# Patient Record
Sex: FEMALE | Race: WHITE | Employment: OTHER | URBAN - METROPOLITAN AREA
[De-identification: names, ages, dates, MRNs, and addresses within clinical notes are randomized per-mention and may not be internally consistent; named-entity substitution may affect disease eponyms.]

---

## 2017-02-06 ENCOUNTER — TELEPHONE (OUTPATIENT)
Dept: FAMILY MEDICINE CLINIC | Age: 52
End: 2017-02-06

## 2018-08-22 ENCOUNTER — HOSPITAL ENCOUNTER (INPATIENT)
Age: 53
LOS: 7 days | Discharge: HOME | DRG: 052 | End: 2018-08-29
Attending: EMERGENCY MEDICINE | Admitting: INTERNAL MEDICINE
Payer: MEDICAID

## 2018-08-22 ENCOUNTER — APPOINTMENT (OUTPATIENT)
Dept: GENERAL RADIOLOGY | Age: 53
DRG: 052 | End: 2018-08-22
Payer: MEDICAID

## 2018-08-22 ENCOUNTER — APPOINTMENT (OUTPATIENT)
Dept: CT IMAGING | Age: 53
DRG: 052 | End: 2018-08-22
Payer: MEDICAID

## 2018-08-22 ENCOUNTER — APPOINTMENT (OUTPATIENT)
Dept: ULTRASOUND IMAGING | Age: 53
DRG: 052 | End: 2018-08-22
Payer: MEDICAID

## 2018-08-22 DIAGNOSIS — E11.65 TYPE 2 DIABETES MELLITUS WITH HYPERGLYCEMIA, WITHOUT LONG-TERM CURRENT USE OF INSULIN (HCC): ICD-10-CM

## 2018-08-22 DIAGNOSIS — I63.9 CEREBROVASCULAR ACCIDENT (CVA), UNSPECIFIED MECHANISM (HCC): ICD-10-CM

## 2018-08-22 DIAGNOSIS — I67.9 VERTIGO DUE TO CEREBROVASCULAR DISEASE: Primary | ICD-10-CM

## 2018-08-22 DIAGNOSIS — R42 VERTIGO DUE TO CEREBROVASCULAR DISEASE: Primary | ICD-10-CM

## 2018-08-22 PROBLEM — E66.9 OBESITY: Status: ACTIVE | Noted: 2018-08-22

## 2018-08-22 LAB
A/G RATIO: 1.5 (ref 1.1–2.2)
ALBUMIN SERPL-MCNC: 4.7 G/DL (ref 3.4–5)
ALP BLD-CCNC: 73 U/L (ref 40–129)
ALT SERPL-CCNC: 206 U/L (ref 10–40)
ANION GAP SERPL CALCULATED.3IONS-SCNC: 13 MMOL/L (ref 3–16)
AST SERPL-CCNC: 161 U/L (ref 15–37)
BASE EXCESS VENOUS: 1 MMOL/L (ref -3–3)
BASOPHILS ABSOLUTE: 0 K/UL (ref 0–0.2)
BASOPHILS RELATIVE PERCENT: 0.5 %
BILIRUB SERPL-MCNC: 0.7 MG/DL (ref 0–1)
BILIRUBIN URINE: NEGATIVE
BLOOD, URINE: NEGATIVE
BUN BLDV-MCNC: 9 MG/DL (ref 7–20)
CALCIUM SERPL-MCNC: 10 MG/DL (ref 8.3–10.6)
CARBOXYHEMOGLOBIN: 1.9 % (ref 0–1.5)
CHLORIDE BLD-SCNC: 93 MMOL/L (ref 99–110)
CLARITY: CLEAR
CO2: 27 MMOL/L (ref 21–32)
COLOR: YELLOW
CREAT SERPL-MCNC: <0.5 MG/DL (ref 0.6–1.1)
EOSINOPHILS ABSOLUTE: 0.1 K/UL (ref 0–0.6)
EOSINOPHILS RELATIVE PERCENT: 0.9 %
GFR AFRICAN AMERICAN: >60
GFR NON-AFRICAN AMERICAN: >60
GLOBULIN: 3.1 G/DL
GLUCOSE BLD-MCNC: 178 MG/DL (ref 70–99)
GLUCOSE BLD-MCNC: 368 MG/DL (ref 70–99)
GLUCOSE URINE: >=1000 MG/DL
HCO3 VENOUS: 28.8 MMOL/L (ref 23–29)
HCT VFR BLD CALC: 45.2 % (ref 36–48)
HEMOGLOBIN: 15.7 G/DL (ref 12–16)
KETONES, URINE: NEGATIVE MG/DL
LACTIC ACID, SEPSIS: 1.1 MMOL/L (ref 0.4–1.9)
LACTIC ACID: 1 MMOL/L (ref 0.4–2)
LACTIC ACID: 2.5 MMOL/L (ref 0.4–2)
LEUKOCYTE ESTERASE, URINE: NEGATIVE
LYMPHOCYTES ABSOLUTE: 1.8 K/UL (ref 1–5.1)
LYMPHOCYTES RELATIVE PERCENT: 31.1 %
MCH RBC QN AUTO: 29.6 PG (ref 26–34)
MCHC RBC AUTO-ENTMCNC: 34.6 G/DL (ref 31–36)
MCV RBC AUTO: 85.5 FL (ref 80–100)
METHEMOGLOBIN VENOUS: 0.5 %
MICROSCOPIC EXAMINATION: ABNORMAL
MONOCYTES ABSOLUTE: 0.4 K/UL (ref 0–1.3)
MONOCYTES RELATIVE PERCENT: 7.5 %
NEUTROPHILS ABSOLUTE: 3.5 K/UL (ref 1.7–7.7)
NEUTROPHILS RELATIVE PERCENT: 60 %
NITRITE, URINE: NEGATIVE
O2 CONTENT, VEN: 15 VOL %
O2 SAT, VEN: 63 %
O2 THERAPY: ABNORMAL
PCO2, VEN: 57.3 MMHG (ref 40–50)
PDW BLD-RTO: 13.1 % (ref 12.4–15.4)
PERFORMED ON: ABNORMAL
PH UA: 6
PH VENOUS: 7.32 (ref 7.35–7.45)
PLATELET # BLD: 188 K/UL (ref 135–450)
PMV BLD AUTO: 8.9 FL (ref 5–10.5)
PO2, VEN: 31.6 MMHG (ref 25–40)
POTASSIUM SERPL-SCNC: 4.1 MMOL/L (ref 3.5–5.1)
PROTEIN UA: NEGATIVE MG/DL
RBC # BLD: 5.28 M/UL (ref 4–5.2)
SODIUM BLD-SCNC: 133 MMOL/L (ref 136–145)
SPECIFIC GRAVITY UA: 1.01
TCO2 CALC VENOUS: 31 MMOL/L
TOTAL PROTEIN: 7.8 G/DL (ref 6.4–8.2)
TROPONIN: <0.01 NG/ML
URINE TYPE: ABNORMAL
UROBILINOGEN, URINE: 0.2 E.U./DL
WBC # BLD: 5.7 K/UL (ref 4–11)

## 2018-08-22 PROCEDURE — 84484 ASSAY OF TROPONIN QUANT: CPT

## 2018-08-22 PROCEDURE — 85025 COMPLETE CBC W/AUTO DIFF WBC: CPT

## 2018-08-22 PROCEDURE — 76705 ECHO EXAM OF ABDOMEN: CPT

## 2018-08-22 PROCEDURE — 99285 EMERGENCY DEPT VISIT HI MDM: CPT

## 2018-08-22 PROCEDURE — 83036 HEMOGLOBIN GLYCOSYLATED A1C: CPT

## 2018-08-22 PROCEDURE — 70450 CT HEAD/BRAIN W/O DYE: CPT

## 2018-08-22 PROCEDURE — 71046 X-RAY EXAM CHEST 2 VIEWS: CPT

## 2018-08-22 PROCEDURE — 2580000003 HC RX 258: Performed by: EMERGENCY MEDICINE

## 2018-08-22 PROCEDURE — 1200000000 HC SEMI PRIVATE

## 2018-08-22 PROCEDURE — 96374 THER/PROPH/DIAG INJ IV PUSH: CPT

## 2018-08-22 PROCEDURE — 6370000000 HC RX 637 (ALT 250 FOR IP): Performed by: NURSE PRACTITIONER

## 2018-08-22 PROCEDURE — 2580000003 HC RX 258: Performed by: INTERNAL MEDICINE

## 2018-08-22 PROCEDURE — 81003 URINALYSIS AUTO W/O SCOPE: CPT

## 2018-08-22 PROCEDURE — G0378 HOSPITAL OBSERVATION PER HR: HCPCS

## 2018-08-22 PROCEDURE — 36415 COLL VENOUS BLD VENIPUNCTURE: CPT

## 2018-08-22 PROCEDURE — 6370000000 HC RX 637 (ALT 250 FOR IP): Performed by: EMERGENCY MEDICINE

## 2018-08-22 PROCEDURE — 6370000000 HC RX 637 (ALT 250 FOR IP): Performed by: INTERNAL MEDICINE

## 2018-08-22 PROCEDURE — 96361 HYDRATE IV INFUSION ADD-ON: CPT

## 2018-08-22 PROCEDURE — 87040 BLOOD CULTURE FOR BACTERIA: CPT

## 2018-08-22 PROCEDURE — 6360000002 HC RX W HCPCS: Performed by: EMERGENCY MEDICINE

## 2018-08-22 PROCEDURE — 83605 ASSAY OF LACTIC ACID: CPT

## 2018-08-22 PROCEDURE — 80074 ACUTE HEPATITIS PANEL: CPT

## 2018-08-22 PROCEDURE — 82803 BLOOD GASES ANY COMBINATION: CPT

## 2018-08-22 PROCEDURE — 96375 TX/PRO/DX INJ NEW DRUG ADDON: CPT

## 2018-08-22 PROCEDURE — 80053 COMPREHEN METABOLIC PANEL: CPT

## 2018-08-22 RX ORDER — LOSARTAN POTASSIUM AND HYDROCHLOROTHIAZIDE 25; 100 MG/1; MG/1
1 TABLET ORAL DAILY
Status: DISCONTINUED | OUTPATIENT
Start: 2018-08-22 | End: 2018-08-22 | Stop reason: CLARIF

## 2018-08-22 RX ORDER — PRAVASTATIN SODIUM 20 MG
20 TABLET ORAL NIGHTLY
Status: DISCONTINUED | OUTPATIENT
Start: 2018-08-22 | End: 2018-08-22

## 2018-08-22 RX ORDER — 0.9 % SODIUM CHLORIDE 0.9 %
1000 INTRAVENOUS SOLUTION INTRAVENOUS ONCE
Status: DISCONTINUED | OUTPATIENT
Start: 2018-08-22 | End: 2018-08-22

## 2018-08-22 RX ORDER — CITALOPRAM 20 MG/1
20 TABLET ORAL DAILY
Status: DISCONTINUED | OUTPATIENT
Start: 2018-08-23 | End: 2018-08-29 | Stop reason: HOSPADM

## 2018-08-22 RX ORDER — ASPIRIN 81 MG/1
81 TABLET ORAL DAILY
Status: DISCONTINUED | OUTPATIENT
Start: 2018-08-22 | End: 2018-08-22 | Stop reason: SDUPTHER

## 2018-08-22 RX ORDER — HYDROCHLOROTHIAZIDE 25 MG/1
25 TABLET ORAL DAILY
Status: DISCONTINUED | OUTPATIENT
Start: 2018-08-22 | End: 2018-08-22

## 2018-08-22 RX ORDER — KETOROLAC TROMETHAMINE 30 MG/ML
30 INJECTION, SOLUTION INTRAMUSCULAR; INTRAVENOUS ONCE
Status: COMPLETED | OUTPATIENT
Start: 2018-08-22 | End: 2018-08-22

## 2018-08-22 RX ORDER — 0.9 % SODIUM CHLORIDE 0.9 %
30 INTRAVENOUS SOLUTION INTRAVENOUS ONCE
Status: COMPLETED | OUTPATIENT
Start: 2018-08-22 | End: 2018-08-22

## 2018-08-22 RX ORDER — ONDANSETRON 2 MG/ML
4 INJECTION INTRAMUSCULAR; INTRAVENOUS EVERY 6 HOURS PRN
Status: DISCONTINUED | OUTPATIENT
Start: 2018-08-22 | End: 2018-08-29 | Stop reason: HOSPADM

## 2018-08-22 RX ORDER — MECLIZINE HCL 12.5 MG/1
12.5 TABLET ORAL 3 TIMES DAILY PRN
Status: DISCONTINUED | OUTPATIENT
Start: 2018-08-22 | End: 2018-08-29 | Stop reason: HOSPADM

## 2018-08-22 RX ORDER — MECLIZINE HCL 12.5 MG/1
25 TABLET ORAL ONCE
Status: COMPLETED | OUTPATIENT
Start: 2018-08-22 | End: 2018-08-22

## 2018-08-22 RX ORDER — ASPIRIN 81 MG/1
81 TABLET, CHEWABLE ORAL DAILY
Status: DISCONTINUED | OUTPATIENT
Start: 2018-08-22 | End: 2018-08-29 | Stop reason: HOSPADM

## 2018-08-22 RX ORDER — FLUTICASONE PROPIONATE 50 MCG
1 SPRAY, SUSPENSION (ML) NASAL DAILY
Status: DISCONTINUED | OUTPATIENT
Start: 2018-08-23 | End: 2018-08-29 | Stop reason: HOSPADM

## 2018-08-22 RX ORDER — HYDROCHLOROTHIAZIDE 25 MG/1
25 TABLET ORAL DAILY
Status: DISCONTINUED | OUTPATIENT
Start: 2018-08-22 | End: 2018-08-29 | Stop reason: HOSPADM

## 2018-08-22 RX ORDER — SODIUM CHLORIDE 0.9 % (FLUSH) 0.9 %
10 SYRINGE (ML) INJECTION EVERY 12 HOURS SCHEDULED
Status: DISCONTINUED | OUTPATIENT
Start: 2018-08-22 | End: 2018-08-29 | Stop reason: HOSPADM

## 2018-08-22 RX ORDER — SODIUM CHLORIDE 0.9 % (FLUSH) 0.9 %
10 SYRINGE (ML) INJECTION PRN
Status: DISCONTINUED | OUTPATIENT
Start: 2018-08-22 | End: 2018-08-29 | Stop reason: HOSPADM

## 2018-08-22 RX ORDER — SODIUM CHLORIDE 0.9 % (FLUSH) 0.9 %
10 SYRINGE (ML) INJECTION PRN
Status: DISCONTINUED | OUTPATIENT
Start: 2018-08-22 | End: 2018-08-22

## 2018-08-22 RX ORDER — SODIUM CHLORIDE 0.9 % (FLUSH) 0.9 %
10 SYRINGE (ML) INJECTION EVERY 12 HOURS SCHEDULED
Status: DISCONTINUED | OUTPATIENT
Start: 2018-08-22 | End: 2018-08-22

## 2018-08-22 RX ORDER — LOSARTAN POTASSIUM 100 MG/1
100 TABLET ORAL DAILY
Status: DISCONTINUED | OUTPATIENT
Start: 2018-08-22 | End: 2018-08-29 | Stop reason: HOSPADM

## 2018-08-22 RX ORDER — METOCLOPRAMIDE HYDROCHLORIDE 5 MG/ML
10 INJECTION INTRAMUSCULAR; INTRAVENOUS ONCE
Status: COMPLETED | OUTPATIENT
Start: 2018-08-22 | End: 2018-08-22

## 2018-08-22 RX ORDER — SODIUM CHLORIDE 9 MG/ML
INJECTION, SOLUTION INTRAVENOUS CONTINUOUS
Status: DISCONTINUED | OUTPATIENT
Start: 2018-08-22 | End: 2018-08-22

## 2018-08-22 RX ORDER — LOSARTAN POTASSIUM 100 MG/1
100 TABLET ORAL DAILY
Status: DISCONTINUED | OUTPATIENT
Start: 2018-08-22 | End: 2018-08-22

## 2018-08-22 RX ADMIN — METOCLOPRAMIDE 10 MG: 5 INJECTION, SOLUTION INTRAMUSCULAR; INTRAVENOUS at 14:38

## 2018-08-22 RX ADMIN — SODIUM CHLORIDE: 9 INJECTION, SOLUTION INTRAVENOUS at 17:14

## 2018-08-22 RX ADMIN — MECLIZINE 25 MG: 12.5 TABLET ORAL at 17:13

## 2018-08-22 RX ADMIN — SODIUM CHLORIDE, PRESERVATIVE FREE 10 ML: 5 INJECTION INTRAVENOUS at 23:42

## 2018-08-22 RX ADMIN — LOSARTAN POTASSIUM 100 MG: 100 TABLET, FILM COATED ORAL at 23:39

## 2018-08-22 RX ADMIN — INSULIN HUMAN 10 UNITS: 100 INJECTION, SOLUTION PARENTERAL at 13:11

## 2018-08-22 RX ADMIN — HYDROCHLOROTHIAZIDE 25 MG: 25 TABLET ORAL at 23:39

## 2018-08-22 RX ADMIN — SODIUM CHLORIDE 2817 ML: 9 INJECTION, SOLUTION INTRAVENOUS at 14:38

## 2018-08-22 RX ADMIN — ASPIRIN 81 MG 81 MG: 81 TABLET ORAL at 23:39

## 2018-08-22 RX ADMIN — MECLIZINE 12.5 MG: 12.5 TABLET ORAL at 23:44

## 2018-08-22 RX ADMIN — INSULIN LISPRO 5 UNITS: 100 INJECTION, SOLUTION INTRAVENOUS; SUBCUTANEOUS at 23:39

## 2018-08-22 RX ADMIN — KETOROLAC TROMETHAMINE 30 MG: 30 INJECTION, SOLUTION INTRAMUSCULAR at 14:38

## 2018-08-22 ASSESSMENT — PAIN SCALES - GENERAL
PAINLEVEL_OUTOF10: 9
PAINLEVEL_OUTOF10: 4

## 2018-08-22 ASSESSMENT — PAIN DESCRIPTION - PAIN TYPE: TYPE: ACUTE PAIN

## 2018-08-22 ASSESSMENT — PAIN DESCRIPTION - LOCATION: LOCATION: HEAD

## 2018-08-22 NOTE — H&P
Hospital Medicine History & Physical      PCP: Chapin Jennings    Date of Admission: 2018    Date of Service: Pt seen/examined on 2018 and Admitted to Inpatient with expected LOS greater than two midnights due to medical therapy. Chief Complaint:  Vertigo for last 5 days      History Of Present Illness:  (  49 y.o. female who presented to [facility] with about complaint. She developed what I go 5 days ago. Persistently the area and she saw the doctor today and she was sent to emergency room for further evaluation. Whenever she started with what I go she experiences double vision. He has some visual changes time to time. No change in mental status, headache, fever, focal neurological symptoms over the arms or legs. her appetite is good. She she gets cough when she lies down specially in the night and she uses Flonase  For  nasal congestion. Denies tinnitus or ear pain. Denies chest pain, shortness of breath or syncope    Past Medical History:          Diagnosis Date    Diabetes mellitus ([address]     Hyperlipidemia     Hypertension        Past Surgical History:          Procedure Laterality Date     SECTION      HYSTERECTOMY         Medications Prior to Admission:      Prior to Admission medications    Medication Sig Start Date End Date Taking?  Authorizing Provider   citalopram (CELEXA) 20 MG tablet TAKE 1 TABLET BY MOUTH EVERY DAY 16  Yes Obed Aragon MD   [facility] ALLERGY RELIEF 50 MCG/ACT nasal spray  16  Yes Historical Provider, MD   losartan-hydrochlorothiazide (HYZAAR) 100-25 MG per tablet Take 1 tablet by mouth daily 16  Yes Celso Dimas MD   pravastatin (PRAVACHOL) 20 MG tablet Take 1 tablet by mouth nightly 16  Yes Celso Dimas MD   metFORMIN (GLUCOPHAGE) 1000 MG tablet TAKE 1 TABLET BY MOUTH TWICE DAILY WITH A MEAL 16  Yes Celso Dimas MD   aspirin 81 MG tablet Take 81 mg by mouth daily   Yes Historical Provider, MD Allergies:  Atorvastatin; Codeine; and Penicillins    Social History:      The patient currently lives with family    TOBACCO:   reports that she has never smoked. She has never used smokeless tobacco.  ETOH:   reports that she does not drink alcohol. Family History:      Reviewed in detail and negative for DM, CAD, Cancer, CVA. Positive as follows:    Family History   Problem Relation Age of Onset    High Blood Pressure Mother     High Cholesterol Mother     Stroke Mother     Diabetes Mother     Cancer Mother     Mental Illness Mother     Arthritis Mother     High Blood Pressure Father     High Cholesterol Father     Diabetes Father     Mental Illness Father     High Blood Pressure Sister     High Cholesterol Sister     Mental Illness Sister        REVIEW OF SYSTEMS:   Pertinent positives as noted in the HPI. All other systems reviewed and negative. PHYSICAL EXAM PERFORMED:    BP (!) 144/92   Pulse 76   Temp 99.4 °F (37.4 °C) (Oral)   Resp 12   Ht 5' 8\" (1.727 m)   Wt 207 lb (93.9 kg)   LMP  (LMP Unknown)   SpO2 99%   BMI 31.47 kg/m²     General appearance:  No apparent distress, appears stated age and cooperative. HEENT:  Normal cephalic, atraumatic without obvious deformity. Pupils equal, round, and reactive to light. Extra ocular muscles intact. Conjunctivae/corneas clear. patient is not fully cooperative for eye examination because of worsening dizziness  Neck: Supple, with full range of motion. No jugular venous distention. Trachea midline. Respiratory:  Normal respiratory effort. Clear to auscultation, bilaterally without Rales/Wheezes/Rhonchi. Cardiovascular:  Regular rate and rhythm with normal S1/S2 without murmurs, rubs or gallops. Abdomen: Soft, non-tender, non-distended with normal bowel sounds. Obese  Musculoskeletal:  No clubbing, cyanosis or edema bilaterally. Full range of motion without deformity.   Skin: Skin color, texture, turgor normal.  No rashes or lesions. Neurologic:  Neurovascularly intact without any focal sensory/motor deficits. Cranial nerves: II-XII intact, grossly non-focal.  Psychiatric:  Alert and oriented, thought content appropriate, normal insight  Capillary Refill: Brisk,< 3 seconds   Peripheral Pulses: +2 palpable, equal bilaterally       Labs:     Recent Labs      08/22/18   1220   WBC  5.7   HGB  15.7   HCT  45.2   PLT  188     Recent Labs      08/22/18   1220   NA  133*   K  4.1   CL  93*   CO2  27   BUN  9   CREATININE  <0.5*   CALCIUM  10.0     Recent Labs      08/22/18   1220   AST  161*   ALT  206*   BILITOT  0.7   ALKPHOS  73     No results for input(s): INR in the last 72 hours. No results for input(s): Nora Dark in the last 72 hours. Urinalysis:      Lab Results   Component Value Date    NITRU Negative 08/22/2018    WBCUA 20-50 03/12/2015    BACTERIA 2+ 03/12/2015    RBCUA 0-2 03/12/2015    BLOODU Negative 08/22/2018    SPECGRAV 1.010 08/22/2018    GLUCOSEU >=1000 08/22/2018       Radiology:     CXR: I have reviewed the CXR with the following interpretation:   EKG:  I have reviewed the EKG with the following interpretation: normal sinus rhythm per Dr. Indiana Kingsley ER physician ,could not locate the EKG at this time     CT HEAD WO CONTRAST   Final Result   No acute intracranial hemorrhage or mass effect. US GALLBLADDER RUQ   Final Result   Fatty liver         XR CHEST STANDARD (2 VW)   Final Result   No acute findings.              ASSESSMENT:    Active Hospital Problems    Diagnosis Date Noted    Vertigo [R42] 08/22/2018    Obesity [E66.9] 08/22/2018    DM (diabetes mellitus), type 2 ([facility] [address] [E11.9] 06/25/2015    HTN (hypertension) [I10] 06/25/2015    HLD (hyperlipidemia) [E78.5] 06/25/2015         PLAN:    Vertigo with visual changes - rule out cardio neurogenic causes - admit, telemetry, troponin, neuro check, aspirin, statin( on hold secondary to elevated liver function test), MRI of the brain MRA of the brain,

## 2018-08-23 ENCOUNTER — APPOINTMENT (OUTPATIENT)
Dept: MRI IMAGING | Age: 53
DRG: 052 | End: 2018-08-23
Payer: MEDICAID

## 2018-08-23 PROBLEM — I67.9 VERTIGO DUE TO CEREBROVASCULAR DISEASE: Status: ACTIVE | Noted: 2018-08-22

## 2018-08-23 LAB
ALBUMIN SERPL-MCNC: 3.9 G/DL (ref 3.4–5)
ALP BLD-CCNC: 60 U/L (ref 40–129)
ALT SERPL-CCNC: 179 U/L (ref 10–40)
AST SERPL-CCNC: 146 U/L (ref 15–37)
BILIRUB SERPL-MCNC: 0.4 MG/DL (ref 0–1)
BILIRUBIN DIRECT: <0.2 MG/DL (ref 0–0.3)
BILIRUBIN, INDIRECT: ABNORMAL MG/DL (ref 0–1)
CHOLESTEROL, TOTAL: 193 MG/DL (ref 0–199)
ESTIMATED AVERAGE GLUCOSE: 248.9 MG/DL
ESTIMATED AVERAGE GLUCOSE: 257.5 MG/DL
GLUCOSE BLD-MCNC: 243 MG/DL (ref 70–99)
GLUCOSE BLD-MCNC: 284 MG/DL (ref 70–99)
GLUCOSE BLD-MCNC: 287 MG/DL (ref 70–99)
GLUCOSE BLD-MCNC: 298 MG/DL (ref 70–99)
HAV IGM SER IA-ACNC: NORMAL
HAV IGM SER IA-ACNC: NORMAL
HBA1C MFR BLD: 10.3 %
HBA1C MFR BLD: 10.6 %
HDLC SERPL-MCNC: 36 MG/DL (ref 40–60)
HEPATITIS B CORE IGM ANTIBODY: NORMAL
HEPATITIS B CORE IGM ANTIBODY: NORMAL
HEPATITIS B SURFACE ANTIGEN INTERPRETATION: NORMAL
HEPATITIS B SURFACE ANTIGEN INTERPRETATION: NORMAL
HEPATITIS C ANTIBODY INTERPRETATION: NORMAL
HEPATITIS C ANTIBODY INTERPRETATION: NORMAL
LDL CHOLESTEROL CALCULATED: 108 MG/DL
LV EF: 55 %
LVEF MODALITY: NORMAL
PERFORMED ON: ABNORMAL
TOTAL PROTEIN: 6.5 G/DL (ref 6.4–8.2)
TRIGL SERPL-MCNC: 245 MG/DL (ref 0–150)
TROPONIN: <0.01 NG/ML
VLDLC SERPL CALC-MCNC: 49 MG/DL

## 2018-08-23 PROCEDURE — 6360000002 HC RX W HCPCS: Performed by: INTERNAL MEDICINE

## 2018-08-23 PROCEDURE — G8989 SELF CARE D/C STATUS: HCPCS

## 2018-08-23 PROCEDURE — 97162 PT EVAL MOD COMPLEX 30 MIN: CPT

## 2018-08-23 PROCEDURE — 97110 THERAPEUTIC EXERCISES: CPT

## 2018-08-23 PROCEDURE — G8978 MOBILITY CURRENT STATUS: HCPCS

## 2018-08-23 PROCEDURE — 97165 OT EVAL LOW COMPLEX 30 MIN: CPT

## 2018-08-23 PROCEDURE — 6370000000 HC RX 637 (ALT 250 FOR IP): Performed by: NURSE PRACTITIONER

## 2018-08-23 PROCEDURE — 93010 ELECTROCARDIOGRAM REPORT: CPT | Performed by: INTERNAL MEDICINE

## 2018-08-23 PROCEDURE — 1200000000 HC SEMI PRIVATE

## 2018-08-23 PROCEDURE — 97116 GAIT TRAINING THERAPY: CPT

## 2018-08-23 PROCEDURE — G8988 SELF CARE GOAL STATUS: HCPCS

## 2018-08-23 PROCEDURE — 96372 THER/PROPH/DIAG INJ SC/IM: CPT

## 2018-08-23 PROCEDURE — 2580000003 HC RX 258: Performed by: NURSE PRACTITIONER

## 2018-08-23 PROCEDURE — 80076 HEPATIC FUNCTION PANEL: CPT

## 2018-08-23 PROCEDURE — 36415 COLL VENOUS BLD VENIPUNCTURE: CPT

## 2018-08-23 PROCEDURE — G0378 HOSPITAL OBSERVATION PER HR: HCPCS

## 2018-08-23 PROCEDURE — 93005 ELECTROCARDIOGRAM TRACING: CPT | Performed by: INTERNAL MEDICINE

## 2018-08-23 PROCEDURE — 70544 MR ANGIOGRAPHY HEAD W/O DYE: CPT

## 2018-08-23 PROCEDURE — 97535 SELF CARE MNGMENT TRAINING: CPT

## 2018-08-23 PROCEDURE — G8979 MOBILITY GOAL STATUS: HCPCS

## 2018-08-23 PROCEDURE — 70551 MRI BRAIN STEM W/O DYE: CPT

## 2018-08-23 PROCEDURE — 6370000000 HC RX 637 (ALT 250 FOR IP): Performed by: INTERNAL MEDICINE

## 2018-08-23 PROCEDURE — 99223 1ST HOSP IP/OBS HIGH 75: CPT | Performed by: PSYCHIATRY & NEUROLOGY

## 2018-08-23 PROCEDURE — C8929 TTE W OR WO FOL WCON,DOPPLER: HCPCS

## 2018-08-23 PROCEDURE — 80061 LIPID PANEL: CPT

## 2018-08-23 PROCEDURE — 2580000003 HC RX 258: Performed by: INTERNAL MEDICINE

## 2018-08-23 PROCEDURE — G8987 SELF CARE CURRENT STATUS: HCPCS

## 2018-08-23 PROCEDURE — 84484 ASSAY OF TROPONIN QUANT: CPT

## 2018-08-23 PROCEDURE — 80074 ACUTE HEPATITIS PANEL: CPT

## 2018-08-23 RX ORDER — ACETAMINOPHEN 325 MG/1
650 TABLET ORAL EVERY 4 HOURS PRN
Status: DISCONTINUED | OUTPATIENT
Start: 2018-08-23 | End: 2018-08-29 | Stop reason: HOSPADM

## 2018-08-23 RX ORDER — DEXTROSE MONOHYDRATE 25 G/50ML
12.5 INJECTION, SOLUTION INTRAVENOUS PRN
Status: DISCONTINUED | OUTPATIENT
Start: 2018-08-23 | End: 2018-08-29 | Stop reason: HOSPADM

## 2018-08-23 RX ORDER — DEXTROSE MONOHYDRATE 50 MG/ML
100 INJECTION, SOLUTION INTRAVENOUS PRN
Status: DISCONTINUED | OUTPATIENT
Start: 2018-08-23 | End: 2018-08-29 | Stop reason: HOSPADM

## 2018-08-23 RX ORDER — SODIUM CHLORIDE 9 MG/ML
INJECTION, SOLUTION INTRAVENOUS CONTINUOUS
Status: DISCONTINUED | OUTPATIENT
Start: 2018-08-23 | End: 2018-08-25

## 2018-08-23 RX ORDER — NICOTINE POLACRILEX 4 MG
15 LOZENGE BUCCAL PRN
Status: DISCONTINUED | OUTPATIENT
Start: 2018-08-23 | End: 2018-08-29 | Stop reason: HOSPADM

## 2018-08-23 RX ADMIN — ACETAMINOPHEN 650 MG: 325 TABLET, FILM COATED ORAL at 17:11

## 2018-08-23 RX ADMIN — LOSARTAN POTASSIUM 100 MG: 100 TABLET, FILM COATED ORAL at 08:30

## 2018-08-23 RX ADMIN — ACETAMINOPHEN 650 MG: 325 TABLET, FILM COATED ORAL at 23:45

## 2018-08-23 RX ADMIN — MECLIZINE 12.5 MG: 12.5 TABLET ORAL at 07:41

## 2018-08-23 RX ADMIN — CITALOPRAM HYDROBROMIDE 20 MG: 20 TABLET ORAL at 08:30

## 2018-08-23 RX ADMIN — INSULIN LISPRO 9 UNITS: 100 INJECTION, SOLUTION INTRAVENOUS; SUBCUTANEOUS at 09:22

## 2018-08-23 RX ADMIN — SODIUM CHLORIDE, PRESERVATIVE FREE 10 ML: 5 INJECTION INTRAVENOUS at 08:30

## 2018-08-23 RX ADMIN — MECLIZINE 12.5 MG: 12.5 TABLET ORAL at 23:45

## 2018-08-23 RX ADMIN — INSULIN LISPRO 5 UNITS: 100 INJECTION, SOLUTION INTRAVENOUS; SUBCUTANEOUS at 23:38

## 2018-08-23 RX ADMIN — INSULIN LISPRO 9 UNITS: 100 INJECTION, SOLUTION INTRAVENOUS; SUBCUTANEOUS at 14:04

## 2018-08-23 RX ADMIN — ASPIRIN 81 MG 81 MG: 81 TABLET ORAL at 08:30

## 2018-08-23 RX ADMIN — SODIUM CHLORIDE: 9 INJECTION, SOLUTION INTRAVENOUS at 15:38

## 2018-08-23 RX ADMIN — ENOXAPARIN SODIUM 40 MG: 40 INJECTION, SOLUTION INTRAVENOUS; SUBCUTANEOUS at 08:30

## 2018-08-23 RX ADMIN — INSULIN LISPRO 6 UNITS: 100 INJECTION, SOLUTION INTRAVENOUS; SUBCUTANEOUS at 18:22

## 2018-08-23 RX ADMIN — HYDROCHLOROTHIAZIDE 25 MG: 25 TABLET ORAL at 08:30

## 2018-08-23 ASSESSMENT — PAIN DESCRIPTION - PROGRESSION: CLINICAL_PROGRESSION: NOT CHANGED

## 2018-08-23 ASSESSMENT — PAIN SCALES - GENERAL
PAINLEVEL_OUTOF10: 3
PAINLEVEL_OUTOF10: 7
PAINLEVEL_OUTOF10: 9
PAINLEVEL_OUTOF10: 4

## 2018-08-23 ASSESSMENT — PAIN DESCRIPTION - LOCATION
LOCATION: EYE;HEAD;NECK
LOCATION: EYE;HEAD;NECK

## 2018-08-23 ASSESSMENT — PAIN DESCRIPTION - DESCRIPTORS
DESCRIPTORS: DISCOMFORT
DESCRIPTORS: ACHING

## 2018-08-23 ASSESSMENT — PAIN DESCRIPTION - PAIN TYPE
TYPE: ACUTE PAIN
TYPE: ACUTE PAIN

## 2018-08-23 ASSESSMENT — PAIN DESCRIPTION - ONSET: ONSET: ON-GOING

## 2018-08-23 ASSESSMENT — PAIN DESCRIPTION - FREQUENCY: FREQUENCY: CONTINUOUS

## 2018-08-23 ASSESSMENT — PAIN DESCRIPTION - ORIENTATION: ORIENTATION: LEFT

## 2018-08-23 NOTE — CARE COORDINATION
Chart reviewed. Pt from home with family support, no services noted prior to admission. PT recommending Home with assist PRN , Outpatient PT. Pt will need doctor prescription for outp therapy, and can schedule that by herself. No discharge needs noted at this time, please notify DCP if needs arise.   Checo Bhat RN DCP

## 2018-08-23 NOTE — FLOWSHEET NOTE
Perfect Serve:    Pt is c/o a headache and dizziness. She received antivert and toradol in the ED and pt states it helped.   Thanks

## 2018-08-23 NOTE — PROGRESS NOTES
Occupational Therapy   Occupational Therapy Initial Assessment and Discharge Summary  Date: 2018   Patient Name: Yolanda Veliz  MRN: [id number]     : 1965    Date of Service: 2018    Discharge Recommendations:  Home with assist PRN (Pt may benefit from initial  supervision/assist)  OT Equipment Recommendations  Equipment Needed: No      Patient Diagnosis(es): The primary encounter diagnosis was Vertigo due to cerebrovascular disease. A diagnosis of Cerebrovascular accident (CVA), unspecified mechanism ([facility] [address] was also pertinent to this visit. has a past medical history of Diabetes mellitus ([facility] [address] Hyperlipidemia; and Hypertension. has a past surgical history that includes Hysterectomy and  section.     Restrictions  Restrictions/Precautions  Restrictions/Precautions: General Precautions, Fall Risk  Position Activity Restriction  Other position/activity restrictions: up with assist     Subjective   General  Chart Reviewed: Yes  Patient assessed for rehabilitation services?: Yes  Family / Caregiver Present: No  Referring Practitioner: Jesenia Gomez MD  Diagnosis: Vertigo with visual changes   Subjective  Subjective: Pt in bed on arrival, agreeable to eval and treat  General Comment  Comments: Per RN okay to treat  Pain Assessment  Patient Currently in Pain: Yes  Pain Assessment: 0-10  Pain Level: 9  Pain Type: Acute pain  Pain Location: Eye, Head, Neck  Pain Orientation: Left  Pain Descriptors: Aching  Pain Frequency: Continuous  Clinical Progression: Not changed  Pain Intervention(s): Repositioned, Ambulation/Increased activity, Distraction  Response to Pain Intervention: None    Social/Functional History  Social/Functional History  Lives With: Significant other  Type of Home: Apartment  Home Layout: One level, Laundry in basement  Home Access: Stairs to enter with rails (6)  Bathroom Shower/Tub: Tub/Shower unit  Bathroom Toilet: Standard  Bathroom Equipment: Grab bars in shower  ADL Assistance: Independent  Homemaking Assistance: Independent  Homemaking Responsibilities: Yes (Pt prepares meals, performs cleaning; does laundry at times)  Ambulation Assistance: Independent  Transfer Assistance: Independent  Active : No  Type of occupation: has been off work since December due to RLE and Low back pain. Worked in [employer]  Additional Comments: Pt reports SO works during the day       Objective   Vision: Within Functional Limits  Hearing: Within functional limits    Orientation  Overall Orientation Status: Within Functional Limits     Balance  Sitting Balance: Independent  Standing Balance: Supervision  Standing Balance  Time: 1-2 minutes, >5 minutes, <1 minute  Activity: mobility, standing ADL, transfers  Sit to stand: Supervision  Stand to sit: Supervision  Functional Mobility  Functional - Mobility Device: No device  Activity: Other; To/from bathroom  Assist Level: Stand by assistance  ADL  Grooming: Supervision (in stance at sink)  LE Dressing: Supervision  Toileting: Modified independent   Tone RUE  RUE Tone: Normotonic  Tone LUE  LUE Tone: Normotonic  Coordination  Movements Are Fluid And Coordinated: Yes     Bed mobility  Supine to Sit: Modified independent  Sit to Supine: Unable to assess (pt left up in chair)  Scooting: Modified independent (to EOB)  Transfers  Sit to stand: Supervision  Stand to sit: Supervision  Vision - Basic Assessment  Patient Visual Report: Blurring of vision when changing focal distance  Oculo Motor Control: WNL  Vision Comments: No blurring of vision with reading at close distance. Convergence, saccades, and visual tracking WFL.  Pt with initial c/o dizziness when looking to L, but no further complaint with saccades testing   Cognition  Overall Cognitive Status: WFL        Sensation  Overall Sensation Status: WFL        LUE AROM (degrees)  LUE AROM : WFL  Left Hand AROM (degrees)  Left Hand AROM: WFL  RUE AROM (degrees)  RUE AROM : WFL  Right Hand AROM (degrees)  Right Hand AROM: WFL  LUE Strength  Gross LUE Strength: WFL  RUE Strength  Gross RUE Strength: WFL          Assessment   Performance deficits / Impairments: Decreased functional mobility ; Decreased balance;Decreased ADL status; Decreased high-level IADLs  Assessment: Pt reports typically completes ADL/IADL, and walks without a device, presenting at [address] [place] [address] for functional mobility and transfers, and ADL this session. Pt reaching out for objects with ambulation, demonstrating slow amalia and decreased step length. Believe pt will be able to return home with PRN supervision/assist, reporting no OT concerns for home. PT will continue to work with pt to address balance, functional mobility and transfers. Prognosis: Good  Decision Making: Low Complexity  Patient Education: role of OT, safety   REQUIRES OT FOLLOW UP: No  Activity Tolerance  Activity Tolerance: Patient Tolerated treatment well  Safety Devices  Safety Devices in place: Yes  Type of devices: Left in chair;Chair alarm in place;Call light within reach;Gait belt;Nurse notified           G-Code  OT G-codes  Functional Assessment Tool Used: -PAC   Score: 21  Functional Limitation: Self care  Self Care Current Status (): At least 20 percent but less than 40 percent impaired, limited or restricted  Self Care Goal Status (): At least 20 percent but less than 40 percent impaired, limited or restricted  Self Care Discharge Status ():  At least 20 percent but less than 40 percent impaired, limited or restricted    AM-PAC Score  AM-[facility] Inpatient Daily Activity Raw Score: 21  AM-PAC Inpatient ADL T-Scale Score : 44.27  ADL Inpatient CMS 0-100% Score: 32.79  ADL Inpatient CMS G-Code Modifier : CJ    Goals  Short term goals  Time Frame for Short term goals: 1 time visit  Short term goal 1: Pt will complete functional transfers with supervision or better - GOAL MET  Short term goal 2: Pt will complete toileting with supervision or better - GOAL MET  Patient Goals   Patient goals : \"to get rid of the dizziness\"       Therapy Time   Individual Concurrent Group Co-treatment   Time In 2216 (10 minutes for eval)         Time Out 1104         Minutes 27         Timed Code Treatment Minutes: 17 Minutes     This note to serve as d/c summary should pt d/c prior to next session.     Han Burgess, OTR/L

## 2018-08-23 NOTE — CONSULTS
In patient Neurology consult        [facility]      MD Mike Maher  1965    Date of Service: 2018    Referring Physician: Katerina Bhatt MD      Reason for the consult: Acute dizziness and vertigo    HPI:   The patient is a 46y.o.  years old female with history of diabetes, hypertension and hyperlipidemia who was admitted to [facility] last night with acute vertigo. Symptoms started 5 days ago. Description was spinning sensation with occasional lightheadedness, blurred vision and feeling unsteady. Degree was severe. Duration was waxing and waning but persistent and daily. Triggers including moving her head or body quickly to the left more than the right side. No other associated symptoms. No falling or injury or blackout. No recent fever or chills or change in medications. She wasn't able to function at home. She decided to come to the hospital where she was eventually admitted. Today she is about the same. She denies any recent fever or chills or head trauma or change in medications. No prior history of dizziness. She denies ringing in her ears or hearing loss or chest pain. No other new symptoms today. Other review of system was unremarkable.         Past Medical History:   Diagnosis Date    Diabetes mellitus ([address]     Hyperlipidemia     Hypertension      Family History   Problem Relation Age of Onset    High Blood Pressure Mother     High Cholesterol Mother     Stroke Mother     Diabetes Mother     Cancer Mother     Mental Illness Mother     Arthritis Mother     High Blood Pressure Father     High Cholesterol Father     Diabetes Father     Mental Illness Father     High Blood Pressure Sister     High Cholesterol Sister     Mental Illness Sister      Past Surgical History:   Procedure Laterality Date     SECTION      HYSTERECTOMY       Past Surgical History:   Procedure Laterality Date     SECTION      HYSTERECTOMY Family History   Problem Relation Age of Onset    High Blood Pressure Mother     High Cholesterol Mother     Stroke Mother     Diabetes Mother     Cancer Mother     Mental Illness Mother     Arthritis Mother     High Blood Pressure Father     High Cholesterol Father     Diabetes Father     Mental Illness Father     High Blood Pressure Sister     High Cholesterol Sister     Mental Illness Sister      Social History   Substance Use Topics    Smoking status: Never Smoker    Smokeless tobacco: Never Used    Alcohol use No     Allergies   Allergen Reactions    Atorvastatin Nausea Only    Codeine     Penicillins      Current Facility-Administered Medications   Medication Dose Route Frequency Provider Last Rate Last Dose    glucose (GLUTOSE) 40 % oral gel 15 g  15 g Oral PRN Shannan Beverly, APRN - CNP        dextrose 50 % solution 12.5 g  12.5 g Intravenous PRN Shannan Beverly, APRN - CNP        glucagon (rDNA) injection 1 mg  1 mg Intramuscular PRN Shannan Beverly, APRN - CNP        dextrose 5 % solution  100 mL/hr Intravenous PRN Shannan Beverly, APRN - CNP        aspirin chewable tablet 81 mg  81 mg Oral Daily Jesenia Gomez MD   81 mg at 08/23/18 0830    citalopram (CELEXA) tablet 20 mg  20 mg Oral Daily Jesenia Gomez MD   20 mg at 08/23/18 0830    fluticasone (FLONASE) 50 MCG/ACT nasal spray 1 spray  1 spray Each Nare Daily Jesenia Gomez MD        sodium chloride flush 0.9 % injection 10 mL  10 mL Intravenous 2 times per day Jesenia Gomez MD   10 mL at 08/23/18 0830    sodium chloride flush 0.9 % injection 10 mL  10 mL Intravenous PRN Jesenia Gomez MD        magnesium hydroxide (MILK OF MAGNESIA) 400 MG/5ML suspension 30 mL  30 mL Oral Daily PRN Jesenia Gomez MD        ondansetron (ZOFRAN) injection 4 mg  4 mg Intravenous Q6H PRN Jesenia Gomez MD        enoxaparin (LOVENOX) injection 40 mg  40 mg Subcutaneous Daily Jesenia Gomez MD   40 mg at is symmetric  XI: Shoulder shrug is intact  XII: Tongue movements are normal  Musculoskeletal: 5/5 in all 4 extremities. Normal tone. Reflexes: Bilateral biceps 2/4, triceps 2/4, brachial radialis 2/4, knee 2/4 and ankle 2/4. Planters: flexor bilaterally. Coordination: no pronator drift, no dysmetria. Finger nose finger testing within normal limits. Sensation: normal to all modalities. Gait/Posture: Not tested due to dizziness     Data:  LABS:   Lab Results   Component Value Date     08/22/2018    K 4.1 08/22/2018    CL 93 08/22/2018    CO2 27 08/22/2018    BUN 9 08/22/2018    CREATININE <0.5 08/22/2018    GFRAA >60 08/22/2018    LABGLOM >60 08/22/2018    GLUCOSE 368 08/22/2018    CALCIUM 10.0 08/22/2018     Lab Results   Component Value Date    WBC 5.7 08/22/2018    RBC 5.28 08/22/2018    HGB 15.7 08/22/2018    HCT 45.2 08/22/2018    MCV 85.5 08/22/2018    RDW 13.1 08/22/2018     08/22/2018   No results found for: INR, PROTIME    Neuroimaging and/or  labs reviewed by me and discussed results with the patient    Impression:  Acute dizziness and vertigo. Nonfocal exam otherwise. Less likely central.  Possible benign paroxysmal vertigo or peripheral vertigo. Other possibility could include hypertensive encephalopathy. Blood pressure on admission was 204/91. Less likely cardiac arrhythmia  Hypertension, poorly controlled  Diabetes, poorly controlled. A1c 10  Hyperlipidemia  Depression       Recommendation:  Agree with MRI of the brain  OT and PT consultation  Blood pressure monitor and control. Goal for now is in the 160/90  Insulin sliding scale  Blood sugar monitoring  Continue aspirin  Statin  DVT and GI prophylaxis  Meclizine and Zofran as needed for symptomatic treatment  Hydration  Telemetry  Will consider CTA head and neck if symptoms persisted to rule out vertebrobasilar insufficiency giving multiple risk factor for strokes.   Continue SSRI  Will follow         Thank you for referring such patient. If you have any questions regarding my consult note, please don't hesitate to call me. Renetta Flores MD  [phone]    This dictation was generated by voice recognition computer software.  Although all attempts are made to edit the dictation for accuracy, there may be errors in the  transcription that are not intended

## 2018-08-23 NOTE — PROGRESS NOTES
Physical Therapy    Facility/Department: [facility] A2 CARD TELEMETRY  Initial Assessment    NAME: Echo Francisco  : 1965  MRN: [id number]    Date of Service: 2018    Discharge Recommendations:  Home with assist PRN, Outpatient PT        Patient Diagnosis(es): The primary encounter diagnosis was Vertigo due to cerebrovascular disease. A diagnosis of Cerebrovascular accident (CVA), unspecified mechanism ([address] was also pertinent to this visit. has a past medical history of Diabetes mellitus ([address] Hyperlipidemia; and Hypertension. has a past surgical history that includes Hysterectomy and  section.     Restrictions  Restrictions/Precautions: General Precautions, Fall Risk  Position Activity Restriction  Other position/activity restrictions: up with assist   Vision/Hearing  Vision: Impaired (reports blurry vision)  Hearing: Within functional limits     Subjective  Chart Reviewed: Yes  Patient assessed for rehabilitation services?: Yes  Referring Practitioner: Magda Whitley  Referral Date : 18  Diagnosis: dizziness, blurry vision  Follows Commands: Within Functional Limits  Comments: RN cleared pt for therapy, pt resting in bed on approach  Subjective: Agrees to therapy, reports L eye pain radiating around orbit and to L neck  Pain Screening  Patient Currently in Pain: Yes  Pain Assessment  Pain Assessment: 0-10  Pain Level: 7  Pain Type: Acute pain  Pain Location: Eye;Head;Neck  Pain Orientation: Left  Pain Descriptors: Discomfort  Pain Frequency: Continuous  Pain Onset: On-going  Clinical Progression: Not changed  Pain Intervention(s): Emotional support  Response to Pain Intervention: None  Pre Treatment Pain Screening  Intervention List: Patient able to continue with treatment    Orientation  Orientation  Overall Orientation Status: Within Functional Limits    Social/Functional History  Social/Functional History  Lives With: Significant other  Type of Home: Apartment  Home Layout: One level  Home Access: Stairs to enter with rails (6)  Bathroom Shower/Tub: Tub/Shower unit  Bathroom Toilet: Standard  Bathroom Equipment: Grab bars in shower  ADL Assistance: Independent  Ambulation Assistance: Independent  Transfer Assistance: Independent  Active : No  Type of occupation: has been off work since December due to RLE and Low back pain. Worked in [email or web address]  Objective   RLE AROM: WFL  LLE AROM : WFL  Strength RLE: RLE hip flexion 3+, abd 3+, knee ext 4, DF 5, EHL 5, ankle eversion 5  Strength LLE LLE hip flexion 3+, abd 3+, knee ext 4-, DF 4, EHL 3, ankle eversion 4+ ( pt moves LLE slowly compared to RLE)     Sensation  Overall Sensation Status: WFL  Bed mobility  Supine to Sit: Modified independent  Sit to Supine: Modified independent  Comment: HOB slightly elevated, use of bed rail to pull up, extra time needed  Transfers  Sit to Stand: Supervision  Stand to sit: Supervision  Comment: uses arms to arise and lower both from bed and toilet. Practiced SIt to and from stand 5 x from EOB  Ambulation  Surface: level tile  Device: No Device (Occassionally used sousa rail or reached for wall for support)  Assistance: Contact guard assistance  Quality of Gait: Two episodes of minor Bilat knee buckling during 1st 5 ft of amb, pt reported this was due to dizziness. After that no episodes of buckling or loss of balance. Distance: 80 ft     Balance  Sitting - Static: Good  Standing - Static: Good  Standing - Dynamic: Good  Exercises  Bridging:  (3 x with good hip clearance from bed)  Straight Leg Raise: 5 x B  Heelslides: 5 x B  Gluteal Sets: 5 x B  Hip Extension/Leg Presses: Sit to and from stand 5 x  Hip Abduction: 5 x B supine, 5 x B hooklying isometric clam shell  Knee Passive Range of Motion: Hip ER/IR: 5 x B supine  Ankle Pumps: 10 x B     Assessment   Body structures, Functions, Activity limitations: Decreased functional mobility ; Decreased strength;Decreased endurance;Decreased vision/visual deficit  Assessment: Pt adm with report of visual changes and dizziness. Found to have high blood sugars and reported improvement with antivert. Today MMT inconsistent showing LLE weaker although pt reports difficulty with RLE. Pt did perform modified indep bed mob, supervised transfers and amb 80 ft without device but with CG & occassionally using sousa rail for support. Recommend home assist prn, Out patient PT if symptoms persist.  Treatment Diagnosis: decreased mobility  Specific instructions for Next Treatment: therapeutic ex, functional mob,gait, stairs  Prognosis: Good  Decision Making: Medium Complexity  Patient Education: role of PT< safety, importance of mobility, ther ex  Barriers to Learning: pt voiced understanding  REQUIRES PT FOLLOW UP: Yes  Activity Tolerance  Activity Tolerance: Patient Tolerated treatment well         Plan   Plan  Times per week: 3-5 x week  Specific instructions for Next Treatment: therapeutic ex, functional mob,gait, stairs  Current Treatment Recommendations: Strengthening, Transfer Training, Endurance Training, Gait Training, Functional Mobility Training, Safety Education & Training, Home Exercise Program  Safety Devices  Type of devices: All fall risk precautions in place, Gait belt, Patient at risk for falls, Call light within reach, Left in bed, Nurse notified    G-Code  PT G-Codes  Functional Assessment Tool Used: AM PAC  Score: 18  Functional Limitation: Mobility: Walking and moving around  Mobility: Walking and Moving Around Current Status (): At least 20 percent but less than 40 percent impaired, limited or restricted  Mobility: Walking and Moving Around Goal Status ():  At least 1 percent but less than 20 percent impaired, limited or restricted  AM-PAC Score     AM-PAC Inpatient Mobility without Stair Climbing Raw Score : 18  AM-PAC Inpatient without Stair Climbing T-Scale Score : 51.97  Mobility Inpatient CMS 0-100% Score: 23.26  Mobility Inpatient without Stair

## 2018-08-23 NOTE — ED PROVIDER NOTES
symmetric rise. EYES:  Conjunctiva normal, Pupils equal round and reactive to light, extraocular movements normal.No nystagmus  NECK:  Trachea is midline. No stridor. CHEST:  Regular rate and rhythm, no murmurs/rubs/gallops, normal S1/S2, chest wall non-tender. LUNGS:  No respiratory distress. No abdominal retractions, no sternal retractions. Clear to auscultation bilaterally, no wheezing, no rhochi, no rales  ABDOMEN:  Soft, non-tender, non-distended. No guarding and no rebound. No costovertebral angle tenderness to palpation. Normal BS, no organomegaly, no abdominal masses  EXTREMITIES:  Normal range of motion, no edema, no tenderness, no deformity, distal pulses present. Moves extremities x4 with purpose. SKIN: Warm, dry and intact. NEUROLOGIC: Normal mental status. Moving all extremities to command. Alert and oriented x4 without focal deficit or gross sensory deficit. Normal speech. Strength 5/5 in bilateral upper and lower extremities. Sensation is intact in the upper and lower extremities. Cranial Nerves 2-12 are intact. On her finger to nose she had some ataxia with the left hand and no ataxia in the right hand. She was unable to completely heal to shin due to severe vertigo. The patient kept her eyes closed a significant amount of time during the neurological exam.  I would not able to ambulate her as the vertigo was severe. No truncal ataxia. Test skew:  Negative. Nystamus:  Negative. PSYCHIATRIC: Not anxious, normal mood and affect, thoughts are linear and organized, without delusions/hallucinations, responds appropriately to questions      LABS and DIAGNOSTIC RESULTS    RADIOLOGY  X-RAYS:  I have reviewed radiologic plain film image(s). ALL OTHER NON-PLAIN FILM IMAGES SUCH AS CT, ULTRASOUND AND MRI HAVE BEEN READ BY THE RADIOLOGIST. CT HEAD WO CONTRAST   Final Result   No acute intracranial hemorrhage or mass effect.          US GALLBLADDER RUQ   Final Result   Fatty liver XR CHEST STANDARD (2 VW)   Final Result   No acute findings.          MRI brain without contrast    (Results Pending)   MRA head w/o contrast    (Results Pending)        LABS  Results for orders placed or performed during the hospital encounter of 08/22/18   CBC Auto Differential   Result Value Ref Range    WBC 5.7 4.0 - 11.0 K/uL    RBC 5.28 (H) 4.00 - 5.20 M/uL    Hemoglobin 15.7 12.0 - 16.0 g/dL    Hematocrit 45.2 36.0 - 48.0 %    MCV 85.5 80.0 - 100.0 fL    MCH 29.6 26.0 - 34.0 pg    MCHC 34.6 31.0 - 36.0 g/dL    RDW 13.1 12.4 - 15.4 %    Platelets 976 024 - 547 K/uL    MPV 8.9 5.0 - 10.5 fL    Neutrophils % 60.0 %    Lymphocytes % 31.1 %    Monocytes % 7.5 %    Eosinophils % 0.9 %    Basophils % 0.5 %    Neutrophils # 3.5 1.7 - 7.7 K/uL    Lymphocytes # 1.8 1.0 - 5.1 K/uL    Monocytes # 0.4 0.0 - 1.3 K/uL    Eosinophils # 0.1 0.0 - 0.6 K/uL    Basophils # 0.0 0.0 - 0.2 K/uL   Comprehensive Metabolic Panel   Result Value Ref Range    Sodium 133 (L) 136 - 145 mmol/L    Potassium 4.1 3.5 - 5.1 mmol/L    Chloride 93 (L) 99 - 110 mmol/L    CO2 27 21 - 32 mmol/L    Anion Gap 13 3 - 16    Glucose 368 (H) 70 - 99 mg/dL    BUN 9 7 - 20 mg/dL    CREATININE <0.5 (L) 0.6 - 1.1 mg/dL    GFR Non-African American >60 >60    GFR African American >60 >60    Calcium 10.0 8.3 - 10.6 mg/dL    Total Protein 7.8 6.4 - 8.2 g/dL    Alb 4.7 3.4 - 5.0 g/dL    Albumin/Globulin Ratio 1.5 1.1 - 2.2    Total Bilirubin 0.7 0.0 - 1.0 mg/dL    Alkaline Phosphatase 73 40 - 129 U/L     (H) 10 - 40 U/L     (H) 15 - 37 U/L    Globulin 3.1 g/dL   Lactic Acid, Plasma   Result Value Ref Range    Lactic Acid 2.5 (H) 0.4 - 2.0 mmol/L   Urinalysis   Result Value Ref Range    Color, UA Yellow Straw/Yellow    Clarity, UA Clear Clear    Glucose, Ur >=1000 (A) Negative mg/dL    Bilirubin Urine Negative Negative    Ketones, Urine Negative Negative mg/dL    Specific Gravity, UA 1.010 1.005 - 1.030    Blood, Urine Negative Negative    pH, UA 6.0 Basophils # 0.0 0.0 - 0.2 K/uL   Comprehensive Metabolic Panel   Result Value Ref Range    Sodium 133 (L) 136 - 145 mmol/L    Potassium 4.1 3.5 - 5.1 mmol/L    Chloride 93 (L) 99 - 110 mmol/L    CO2 27 21 - 32 mmol/L    Anion Gap 13 3 - 16    Glucose 368 (H) 70 - 99 mg/dL    BUN 9 7 - 20 mg/dL    CREATININE <0.5 (L) 0.6 - 1.1 mg/dL    GFR Non-African American >60 >60    GFR African American >60 >60    Calcium 10.0 8.3 - 10.6 mg/dL    Total Protein 7.8 6.4 - 8.2 g/dL    Alb 4.7 3.4 - 5.0 g/dL    Albumin/Globulin Ratio 1.5 1.1 - 2.2    Total Bilirubin 0.7 0.0 - 1.0 mg/dL    Alkaline Phosphatase 73 40 - 129 U/L     (H) 10 - 40 U/L     (H) 15 - 37 U/L    Globulin 3.1 g/dL   Lactic Acid, Plasma   Result Value Ref Range    Lactic Acid 2.5 (H) 0.4 - 2.0 mmol/L   Urinalysis   Result Value Ref Range    Color, UA Yellow Straw/Yellow    Clarity, UA Clear Clear    Glucose, Ur >=1000 (A) Negative mg/dL    Bilirubin Urine Negative Negative    Ketones, Urine Negative Negative mg/dL    Specific Gravity, UA 1.010 1.005 - 1.030    Blood, Urine Negative Negative    pH, UA 6.0 5.0 - 8.0    Protein, UA Negative Negative mg/dL    Urobilinogen, Urine 0.2 <2.0 E.U./dL    Nitrite, Urine Negative Negative    Leukocyte Esterase, Urine Negative Negative    Microscopic Examination Not Indicated     Urine Type Not Specified    Lactate, Sepsis   Result Value Ref Range    Lactic Acid, Sepsis 1.1 0.4 - 1.9 mmol/L   Blood gas, venous   Result Value Ref Range    pH, [place] 7.319 (L) 7.350 - 7.450    pCO2, [place] 57.3 (H) 40.0 - 50.0 mmHg    pO2, [place] 31.6 25.0 - 40.0 mmHg    HCO3, Venous 28.8 23.0 - 29.0 mmol/L    Base Excess, [place] 1.0 -3.0 - 3.0 mmol/L    O2 Sat, [place] 63 Not Established %    Carboxyhemoglobin 1.9 (H) 0.0 - 1.5 %    MetHgb, [place] 0.5 <1.5 %    TC02 (Calc), [place] 31 Not Established mmol/L    O2 Content, [place] 15 Not Established VOL %    O2 Therapy Unknown    Lactic acid, plasma   Result Value Ref Range    Lactic Acid 1.0 0.4 - 2.0 mmol/L   POCT Glucose   Result Value Ref Range    POC Glucose 178 (H) 70 - 99 mg/dl    Performed on ACCU-CHEK        I spoke with Dr. Garret Li. We thoroughly discussed the history, physical exam, laboratory and imaging studies, as well as, emergency department course. Based upon that discussion, we've decided to admit Linda Bonner for further observation and evaluation of Radha Ordonez's CVA-like symptoms. As I have deemed necessary from their history, physical and studies, I have considered and evaluated Linda Bonner for the following diagnoses:  DIABETES, INTRACRANIAL HEMORRHAGE, MENINGITIS, SUBARACHNOID HEMORRHAGE, SUBDURAL HEMATOMA, & STROKE. FINAL IMPRESSION  1. Vertigo due to cerebrovascular disease    2. Cerebrovascular accident (CVA), unspecified mechanism ([address]        Vitals:  Blood pressure (!) 164/87, pulse 80, temperature 99.2 °F (37.3 °C), temperature source Oral, resp. rate 16, height 5' 8\" (1.727 m), weight 207 lb (93.9 kg), SpO2 99 %, not currently breastfeeding. Patient was given scripts for the following medications. I counseled patient how to take these medications. Current Discharge Medication List          Disposition  Pt is in stable condition upon Admit to telemetry. Please note, critical care time was 15 minutes, exclusive of separately billable procedures and discussion with the family, specifically for management of the presenting complaint and symptoms initially, direct bedside care, reevaluation, review of records, and consultation. The note was completed using Dragon voice recognition transcription. Every effort was made to ensure accuracy; however, inadvertent transcription errors may be present despite my best efforts to edit errors.     Carol Frank MD  [address] [place] [address] MD Robert  08/22/18 4723

## 2018-08-24 ENCOUNTER — APPOINTMENT (OUTPATIENT)
Dept: NUCLEAR MEDICINE | Age: 53
DRG: 052 | End: 2018-08-24
Payer: MEDICAID

## 2018-08-24 LAB
GLUCOSE BLD-MCNC: 188 MG/DL (ref 70–99)
GLUCOSE BLD-MCNC: 205 MG/DL (ref 70–99)
GLUCOSE BLD-MCNC: 246 MG/DL (ref 70–99)
GLUCOSE BLD-MCNC: 274 MG/DL (ref 70–99)
GLUCOSE BLD-MCNC: 297 MG/DL (ref 70–99)
PERFORMED ON: ABNORMAL

## 2018-08-24 PROCEDURE — 97116 GAIT TRAINING THERAPY: CPT

## 2018-08-24 PROCEDURE — 3430000000 HC RX DIAGNOSTIC RADIOPHARMACEUTICAL: Performed by: NURSE PRACTITIONER

## 2018-08-24 PROCEDURE — 97110 THERAPEUTIC EXERCISES: CPT

## 2018-08-24 PROCEDURE — 6370000000 HC RX 637 (ALT 250 FOR IP): Performed by: INTERNAL MEDICINE

## 2018-08-24 PROCEDURE — G0378 HOSPITAL OBSERVATION PER HR: HCPCS

## 2018-08-24 PROCEDURE — 96372 THER/PROPH/DIAG INJ SC/IM: CPT

## 2018-08-24 PROCEDURE — 6360000002 HC RX W HCPCS: Performed by: NURSE PRACTITIONER

## 2018-08-24 PROCEDURE — 6370000000 HC RX 637 (ALT 250 FOR IP): Performed by: NURSE PRACTITIONER

## 2018-08-24 PROCEDURE — 78452 HT MUSCLE IMAGE SPECT MULT: CPT

## 2018-08-24 PROCEDURE — 99233 SBSQ HOSP IP/OBS HIGH 50: CPT | Performed by: PSYCHIATRY & NEUROLOGY

## 2018-08-24 PROCEDURE — 2500000003 HC RX 250 WO HCPCS: Performed by: NURSE PRACTITIONER

## 2018-08-24 PROCEDURE — A9502 TC99M TETROFOSMIN: HCPCS | Performed by: NURSE PRACTITIONER

## 2018-08-24 PROCEDURE — 1200000000 HC SEMI PRIVATE

## 2018-08-24 PROCEDURE — 96375 TX/PRO/DX INJ NEW DRUG ADDON: CPT

## 2018-08-24 PROCEDURE — 2580000003 HC RX 258: Performed by: INTERNAL MEDICINE

## 2018-08-24 PROCEDURE — 93880 EXTRACRANIAL BILAT STUDY: CPT

## 2018-08-24 PROCEDURE — 6360000002 HC RX W HCPCS: Performed by: INTERNAL MEDICINE

## 2018-08-24 RX ORDER — AMLODIPINE BESYLATE 5 MG/1
10 TABLET ORAL DAILY
Status: DISCONTINUED | OUTPATIENT
Start: 2018-08-25 | End: 2018-08-29 | Stop reason: HOSPADM

## 2018-08-24 RX ORDER — GLIPIZIDE 5 MG/1
2.5 TABLET ORAL
Status: DISCONTINUED | OUTPATIENT
Start: 2018-08-25 | End: 2018-08-24

## 2018-08-24 RX ORDER — GLIPIZIDE 5 MG/1
2.5 TABLET ORAL
Status: DISCONTINUED | OUTPATIENT
Start: 2018-08-24 | End: 2018-08-25

## 2018-08-24 RX ORDER — LABETALOL HYDROCHLORIDE 5 MG/ML
10 INJECTION, SOLUTION INTRAVENOUS ONCE
Status: COMPLETED | OUTPATIENT
Start: 2018-08-24 | End: 2018-08-24

## 2018-08-24 RX ORDER — AMLODIPINE BESYLATE 5 MG/1
5 TABLET ORAL DAILY
Status: DISCONTINUED | OUTPATIENT
Start: 2018-08-24 | End: 2018-08-24

## 2018-08-24 RX ORDER — BLOOD-GLUCOSE METER
1 KIT MISCELLANEOUS
Qty: 1 KIT | Refills: 0 | Status: SHIPPED | OUTPATIENT
Start: 2018-08-24

## 2018-08-24 RX ORDER — HYDRALAZINE HYDROCHLORIDE 20 MG/ML
10 INJECTION INTRAMUSCULAR; INTRAVENOUS EVERY 6 HOURS PRN
Status: DISCONTINUED | OUTPATIENT
Start: 2018-08-24 | End: 2018-08-29 | Stop reason: HOSPADM

## 2018-08-24 RX ORDER — AMLODIPINE BESYLATE 5 MG/1
5 TABLET ORAL DAILY
Qty: 30 TABLET | Refills: 3 | Status: SHIPPED | OUTPATIENT
Start: 2018-08-25 | End: 2018-08-29 | Stop reason: HOSPADM

## 2018-08-24 RX ORDER — GLIPIZIDE 5 MG/1
2.5 TABLET ORAL
Qty: 60 TABLET | Refills: 3 | Status: SHIPPED | OUTPATIENT
Start: 2018-08-25 | End: 2018-08-29 | Stop reason: HOSPADM

## 2018-08-24 RX ORDER — HYDRALAZINE HYDROCHLORIDE 20 MG/ML
5 INJECTION INTRAMUSCULAR; INTRAVENOUS ONCE
Status: COMPLETED | OUTPATIENT
Start: 2018-08-24 | End: 2018-08-24

## 2018-08-24 RX ADMIN — GLIPIZIDE 2.5 MG: 5 TABLET ORAL at 11:37

## 2018-08-24 RX ADMIN — TETROFOSMIN 30.4 MILLICURIE: 0.23 INJECTION, POWDER, LYOPHILIZED, FOR SOLUTION INTRAVENOUS at 13:30

## 2018-08-24 RX ADMIN — AMLODIPINE BESYLATE 5 MG: 5 TABLET ORAL at 11:38

## 2018-08-24 RX ADMIN — SODIUM CHLORIDE, PRESERVATIVE FREE 10 ML: 5 INJECTION INTRAVENOUS at 20:27

## 2018-08-24 RX ADMIN — LOSARTAN POTASSIUM 100 MG: 100 TABLET, FILM COATED ORAL at 09:01

## 2018-08-24 RX ADMIN — HYDRALAZINE HYDROCHLORIDE 5 MG: 20 INJECTION INTRAMUSCULAR; INTRAVENOUS at 20:23

## 2018-08-24 RX ADMIN — ASPIRIN 81 MG 81 MG: 81 TABLET ORAL at 09:01

## 2018-08-24 RX ADMIN — INSULIN LISPRO 3 UNITS: 100 INJECTION, SOLUTION INTRAVENOUS; SUBCUTANEOUS at 23:14

## 2018-08-24 RX ADMIN — LABETALOL HYDROCHLORIDE 10 MG: 5 INJECTION, SOLUTION INTRAVENOUS at 13:02

## 2018-08-24 RX ADMIN — ACETAMINOPHEN 650 MG: 325 TABLET, FILM COATED ORAL at 09:15

## 2018-08-24 RX ADMIN — INSULIN LISPRO 9 UNITS: 100 INJECTION, SOLUTION INTRAVENOUS; SUBCUTANEOUS at 09:01

## 2018-08-24 RX ADMIN — ACETAMINOPHEN 650 MG: 325 TABLET, FILM COATED ORAL at 17:51

## 2018-08-24 RX ADMIN — CITALOPRAM HYDROBROMIDE 20 MG: 20 TABLET ORAL at 09:01

## 2018-08-24 RX ADMIN — INSULIN LISPRO 3 UNITS: 100 INJECTION, SOLUTION INTRAVENOUS; SUBCUTANEOUS at 17:51

## 2018-08-24 RX ADMIN — MECLIZINE 12.5 MG: 12.5 TABLET ORAL at 09:15

## 2018-08-24 RX ADMIN — HYDROCHLOROTHIAZIDE 25 MG: 25 TABLET ORAL at 09:01

## 2018-08-24 RX ADMIN — ENOXAPARIN SODIUM 40 MG: 40 INJECTION, SOLUTION INTRAVENOUS; SUBCUTANEOUS at 09:01

## 2018-08-24 ASSESSMENT — PAIN DESCRIPTION - LOCATION
LOCATION: HEAD
LOCATION: HEAD

## 2018-08-24 ASSESSMENT — PAIN DESCRIPTION - PROGRESSION
CLINICAL_PROGRESSION: NOT CHANGED

## 2018-08-24 ASSESSMENT — PAIN DESCRIPTION - ONSET: ONSET: ON-GOING

## 2018-08-24 ASSESSMENT — PAIN DESCRIPTION - FREQUENCY: FREQUENCY: CONTINUOUS

## 2018-08-24 ASSESSMENT — PAIN SCALES - WONG BAKER
WONGBAKER_NUMERICALRESPONSE: 6

## 2018-08-24 ASSESSMENT — PAIN SCALES - GENERAL
PAINLEVEL_OUTOF10: 9
PAINLEVEL_OUTOF10: 8

## 2018-08-24 ASSESSMENT — PAIN DESCRIPTION - ORIENTATION
ORIENTATION: POSTERIOR
ORIENTATION: LEFT;POSTERIOR

## 2018-08-24 ASSESSMENT — PAIN DESCRIPTION - DESCRIPTORS: DESCRIPTORS: DISCOMFORT

## 2018-08-24 ASSESSMENT — PAIN DESCRIPTION - PAIN TYPE: TYPE: ACUTE PAIN

## 2018-08-24 NOTE — PROGRESS NOTES
Hospitalist Progress Note      PCP: Boyd Abrams    Date of Admission: 8/22/2018    Chief Complaint: Vertigo for last 5 days       Hospital Course:53 y.o. female who [facility] to [facility] with about complaint. She developed what I go 5 days ago. Persistently the area and she saw the doctor today and she was sent to emergency room for further evaluation. Whenever she started with what I go she experiences double vision. He has some visual changes time to time. No change in mental status, headache, fever, focal neurological symptoms over the arms or legs. her appetite is good. She she gets cough when she lies down specially in the night and she uses Flonase  For  nasal congestion. Denies tinnitus or ear pain. Denies chest pain, shortness of breath or syncope     Subjective: Dizziness has improved. No slurred speech weakness. Intermittent double vision has improved.    Addendum: later c/o chest pressure sn pain with HTN   Medications:  Reviewed    Infusion Medications    dextrose      sodium chloride 100 mL/hr at 08/23/18 1538     Scheduled Medications    amLODIPine  5 mg Oral Daily    glipiZIDE  2.5 mg Oral QAM AC    aspirin  81 mg Oral Daily    citalopram  20 mg Oral Daily    fluticasone  1 spray Each Nare Daily    sodium chloride flush  10 mL Intravenous 2 times per day    enoxaparin  40 mg Subcutaneous Daily    insulin lispro  0-18 Units Subcutaneous TID WC    insulin lispro  0-9 Units Subcutaneous Nightly    losartan  100 mg Oral Daily    And    hydrochlorothiazide  25 mg Oral Daily     PRN Meds: glucose, dextrose, glucagon (rDNA), dextrose, acetaminophen, sodium chloride flush, magnesium hydroxide, ondansetron, meclizine      Intake/Output Summary (Last 24 hours) at 08/24/18 1137  Last data filed at 08/24/18 0916   Gross per 24 hour   Intake          2319.54 ml   Output                0 ml   Net          2319.54 ml       Physical Exam Performed:    BP (!) 163/98   Pulse 69 Temp 97.9 °F (36.6 °C) (Oral)   Resp 16   Ht 5' 8\" (1.727 m)   Wt 208 lb 3.2 oz (94.4 kg)   LMP  (LMP Unknown)   SpO2 95%   BMI 31.66 kg/m²     General appearance: No apparent distress, appears stated age and cooperative. HEENT: Pupils equal, round, and reactive to light. Conjunctivae/corneas clear. Neck: Supple, with full range of motion. No jugular venous distention. Trachea midline. Respiratory:  Normal respiratory effort. Clear to auscultation, bilaterally without Rales/Wheezes/Rhonchi. Cardiovascular: Regular rate and rhythm with normal S1/S2 without murmurs, rubs or gallops. Abdomen: Soft, non-tender, non-distended with normal bowel sounds. Musculoskeletal: No clubbing, cyanosis or edema bilaterally. Full range of motion without deformity. Skin: Skin color, texture, turgor normal.  No rashes or lesions. Neurologic: EOM intact, speech clear 5/5 + nystagmus   Psychiatric: Alert and oriented, thought content appropriate, normal insight  Capillary Refill: Brisk,< 3 seconds   Peripheral Pulses: +2 palpable, equal bilaterally       Labs:   Recent Labs      08/22/18   1220   WBC  5.7   HGB  15.7   HCT  45.2   PLT  188     Recent Labs      08/22/18   1220   NA  133*   K  4.1   CL  93*   CO2  27   BUN  9   CREATININE  <0.5*   CALCIUM  10.0     Recent Labs      08/22/18   1220  08/23/18   0348   AST  161*  146*   ALT  206*  179*   BILIDIR   --   <0.2   BILITOT  0.7  0.4   ALKPHOS  73  60     No results for input(s): INR in the last 72 hours. Recent Labs      08/22/18   2316  08/23/18   0348   TROPONINI  <0.01  <0.01       Urinalysis:      Lab Results   Component Value Date    NITRU Negative 08/22/2018    WBCUA 20-50 03/12/2015    BACTERIA 2+ 03/12/2015    RBCUA 0-2 03/12/2015    BLOODU Negative 08/22/2018    SPECGRAV 1.010 08/22/2018    GLUCOSEU >=1000 08/22/2018       Radiology:  MRI brain without contrast   Final Result   No acute intracranial abnormality. Unremarkable exam of the brain.       No

## 2018-08-24 NOTE — PROGRESS NOTES
Daily Skyler Patel MD   81 mg at 08/24/18 0901    citalopram (CELEXA) tablet 20 mg  20 mg Oral Daily Skyler Patel MD   20 mg at 08/24/18 0901    fluticasone (FLONASE) 50 MCG/ACT nasal spray 1 spray  1 spray Each Nare Daily Skyler Patel MD        sodium chloride flush 0.9 % injection 10 mL  10 mL Intravenous 2 times per day Skyler Patel MD   10 mL at 08/23/18 0830    sodium chloride flush 0.9 % injection 10 mL  10 mL Intravenous PRN Skyler Patel MD        magnesium hydroxide (MILK OF MAGNESIA) 400 MG/5ML suspension 30 mL  30 mL Oral Daily PRN Skyler Patel MD        ondansetron (ZOFRAN) injection 4 mg  4 mg Intravenous Q6H PRN Skyler Patel MD        enoxaparin (LOVENOX) injection 40 mg  40 mg Subcutaneous Daily Skyler Patel MD   40 mg at 08/24/18 0901    insulin lispro (HUMALOG) injection vial 0-18 Units  0-18 Units Subcutaneous TID WC Skyler Patel MD   9 Units at 08/24/18 0901    insulin lispro (HUMALOG) injection vial 0-9 Units  0-9 Units Subcutaneous Nightly Skyler Patel MD   5 Units at 08/23/18 2338    losartan (COZAAR) tablet 100 mg  100 mg Oral Daily Brayden Guerrero, APRN - CNP   100 mg at 08/24/18 0901    And    hydrochlorothiazide (HYDRODIURIL) tablet 25 mg  25 mg Oral Daily Sophie Chang, APRN - CNP   25 mg at 08/24/18 0901    meclizine (ANTIVERT) tablet 12.5 mg  12.5 mg Oral TID PRN Bin Smiley, APRN - CNP   12.5 mg at 08/24/18 2179     Allergies   Allergen Reactions    Atorvastatin Nausea Only    Codeine     Penicillins      family history includes Arthritis in her mother; Cancer in her mother; Diabetes in her father and mother; High Blood Pressure in her father, mother, and sister; High Cholesterol in her father, mother, and sister; Mental Illness in her father, mother, and sister; Stroke in her mother. reports that she has never smoked. She has never used smokeless tobacco. She reports that she does not drink alcohol or use drugs. Objective:  Exam:  Constitutional:   Vitals:    08/23/18 1950 08/23/18 2303 08/24/18 0344 08/24/18 0709   BP: 132/75 (!) 174/79 (!) 164/86 (!) 163/98   Pulse: 75 72 66 69   Resp: 16 16 16 16   Temp: 98.5 °F (36.9 °C) 98.4 °F (36.9 °C) 98 °F (36.7 °C) 97.9 °F (36.6 °C)   TempSrc: Oral Oral Oral Oral   SpO2: 97% 97% 94% 95%   Weight:       Height:           General appearance: NAD. Eye: No icterus. PRRR. Neck: supple  Cardiovascular: No carotid bruit. Heart: S1, S2         No lower leg edema with good pulsation. Mental Status: Oriented to person, place, problem, and time. Fluent speech. Normal attention span and concentration. Cranial Nerves:   II: Visual fields: Full to confrontation  III: Pupils: equal, round, reactive to light  III,IV,VI: Extra Ocular Movements are intact. No nystagmus  V: Facial sensation is intact to pin prick and light touch  VII: Facial strength and movements: intact and symmetric smile,cheek puffing and eyebrow elevation  VIII: Hearing: Intact to finger rub bilaterally  IX: Palate elevation is symmetric  XI: Shoulder shrug is intact  XII: Tongue movements are normal  Musculoskeletal: 5/5 in all 4 extremities. Normal tone. Reflexes: Bilateral biceps 2/4, triceps 2/4, brachial radialis 2/4, knee 2/4 and ankle 2/4. Planters: flexor bilaterally. Coordination: no pronator drift, no dysmetria. Finger nose finger testing within normal limits. Sensation: normal to all modalities. Gait/Posture: NT due to headaches.        Data:  LABS:   Lab Results   Component Value Date     08/22/2018    K 4.1 08/22/2018    CL 93 08/22/2018    CO2 27 08/22/2018    BUN 9 08/22/2018    CREATININE <0.5 08/22/2018    GFRAA >60 08/22/2018    LABGLOM >60 08/22/2018    GLUCOSE 368 08/22/2018    CALCIUM 10.0 08/22/2018     Lab Results   Component Value Date    WBC 5.7 08/22/2018    RBC 5.28 08/22/2018    HGB 15.7 08/22/2018    HCT 45.2 08/22/2018    MCV 85.5 08/22/2018    RDW 13.1 08/22/2018  08/22/2018   No results found for: INR, PROTIME  Neuroimaging and/or  labs reviewed by me and discussed results with the patient and  family. Impression:    Acute dizziness and vertigo. Possible hypertensive encephalopathy or peripheral vertigo. Hypertension, poorly controlled  Diabetes, poorly controlled. A1c 10  Hyperlipidemia  Depression    Recommendation  Blood pressure monitor and control. Goal in the hospital is 160/90  Aspirin  Statin  Continue blood sugar monitoring and control  Insulin sliding scale  Pain control  PT and OT  CUS  Continue home blood pressure medications  Secondary stroke prevention, risk of stroke recurrence and risk of poorly controlled hypertension, ICH, CVD and CVA were discussed today  DC planning when medically stable  Will follow         Renetta Flores MD   [phone]      This dictation was generated by voice recognition computer software. Although all attempts are made to edit the dictation for accuracy, there may be errors in the transcription that are not intended.

## 2018-08-24 NOTE — PROGRESS NOTES
x  Standing alternate hip abd 10 x B at sousa rail  Standing B heel raise 10 x at sousa rail  Cervical Rotation 5 x B eyes open    Cervical Flexion to and from neutral 5 x eyes open  Tandem walk 15 ft x 2      Tandem stance 10 sec x 2      Unilat stance LLE 15 sec x 1 ( RLE omitted due to OA R knee and pt fear of buckling)          Assessment   Body structures, Functions, Activity limitations: Decreased functional mobility ; Decreased strength;Decreased endurance;Decreased vision/visual deficit  Assessment: TOday pt demonstrates indep bed mob, transfers, and supervised amb 150 ft x 2. No dizziness or loss of balance. Able to perform cervical ROM without dizziness induced.  Will monitor one more session prior to discontineing PT  Treatment Diagnosis: decreased mobility  Specific instructions for Next Treatment: therapeutic ex, functional mob,gait, stairs  Prognosis: Good  Patient Education: role of PT< safety, importance of mobility, ther ex  Barriers to Learning: pt voiced understanding  Activity Tolerance: Patient Tolerated treatment well  Activity Tolerance: NO report of dizziness, no loss of balance     G-Code  PT G-Codes  Functional Assessment Tool Used: AM PAC  Score: 20        AM-PAC Score     AM-PAC Inpatient Mobility without Stair Climbing Raw Score : 20  AM-PAC Inpatient without Stair Climbing T-Scale Score : 60.57  Mobility Inpatient CMS 0-100% Score: 0  Mobility Inpatient without Stair CMS G-Code Modifier : CH       Goals  Short term goals  Time Frame for Short term goals: 2-4 days  Short term goal 1: Pt to demonstrate modified indep transfers- met  Short term goal 2: pt to amb without device 150 ft supervised- met  Short term goal 3: pt to go up and down 4 stairs with rail  Short term goal 4: pt to participate in LE Ex 10 x each to improve strength and endurance- met  Patient Goals   Patient goals : \"for my eyes to be better and get rid of dizziness\"    Plan    Plan  Times per week: Likely one more session then discont PT if no dizziness  Specific instructions for Next Treatment: therapeutic ex, functional mob,gait, stairs  Current Treatment Recommendations: Strengthening, Transfer Training, Endurance Training, Gait Training, Functional Mobility Training, Safety Education & Training, Home Exercise Program  Safety Devices  Type of devices:  All fall risk precautions in place, Gait belt, Patient at risk for falls, Call light within reach, Left in bed, Nurse notified     Therapy Time   Individual Concurrent Group Co-treatment   Time In [address]         Time Out 4[address] [facility]         Minutes [address], [id number]

## 2018-08-25 LAB
GLUCOSE BLD-MCNC: 227 MG/DL (ref 70–99)
GLUCOSE BLD-MCNC: 245 MG/DL (ref 70–99)
GLUCOSE BLD-MCNC: 250 MG/DL (ref 70–99)
GLUCOSE BLD-MCNC: 253 MG/DL (ref 70–99)
GLUCOSE BLD-MCNC: 261 MG/DL (ref 70–99)
LV EF: 82 %
LVEF MODALITY: NORMAL
PERFORMED ON: ABNORMAL

## 2018-08-25 PROCEDURE — G0378 HOSPITAL OBSERVATION PER HR: HCPCS

## 2018-08-25 PROCEDURE — 6360000002 HC RX W HCPCS: Performed by: NURSE PRACTITIONER

## 2018-08-25 PROCEDURE — 6370000000 HC RX 637 (ALT 250 FOR IP): Performed by: NURSE PRACTITIONER

## 2018-08-25 PROCEDURE — A9502 TC99M TETROFOSMIN: HCPCS | Performed by: INTERNAL MEDICINE

## 2018-08-25 PROCEDURE — 6370000000 HC RX 637 (ALT 250 FOR IP): Performed by: INTERNAL MEDICINE

## 2018-08-25 PROCEDURE — 93017 CV STRESS TEST TRACING ONLY: CPT

## 2018-08-25 PROCEDURE — 96372 THER/PROPH/DIAG INJ SC/IM: CPT

## 2018-08-25 PROCEDURE — 1200000000 HC SEMI PRIVATE

## 2018-08-25 PROCEDURE — 99232 SBSQ HOSP IP/OBS MODERATE 35: CPT | Performed by: PSYCHIATRY & NEUROLOGY

## 2018-08-25 PROCEDURE — 2500000003 HC RX 250 WO HCPCS: Performed by: NURSE PRACTITIONER

## 2018-08-25 PROCEDURE — 2580000003 HC RX 258: Performed by: INTERNAL MEDICINE

## 2018-08-25 PROCEDURE — 6360000002 HC RX W HCPCS: Performed by: INTERNAL MEDICINE

## 2018-08-25 PROCEDURE — 3430000000 HC RX DIAGNOSTIC RADIOPHARMACEUTICAL: Performed by: INTERNAL MEDICINE

## 2018-08-25 PROCEDURE — 96376 TX/PRO/DX INJ SAME DRUG ADON: CPT

## 2018-08-25 RX ORDER — INSULIN GLARGINE 100 [IU]/ML
0.5 INJECTION, SOLUTION SUBCUTANEOUS NIGHTLY
Status: DISCONTINUED | OUTPATIENT
Start: 2018-08-25 | End: 2018-08-29 | Stop reason: HOSPADM

## 2018-08-25 RX ORDER — LABETALOL HYDROCHLORIDE 5 MG/ML
10 INJECTION, SOLUTION INTRAVENOUS ONCE
Status: COMPLETED | OUTPATIENT
Start: 2018-08-25 | End: 2018-08-25

## 2018-08-25 RX ADMIN — INSULIN LISPRO 5 UNITS: 100 INJECTION, SOLUTION INTRAVENOUS; SUBCUTANEOUS at 21:10

## 2018-08-25 RX ADMIN — LOSARTAN POTASSIUM 100 MG: 100 TABLET, FILM COATED ORAL at 10:35

## 2018-08-25 RX ADMIN — HYDRALAZINE HYDROCHLORIDE 10 MG: 20 INJECTION, SOLUTION INTRAMUSCULAR; INTRAVENOUS at 11:24

## 2018-08-25 RX ADMIN — ACETAMINOPHEN 650 MG: 325 TABLET, FILM COATED ORAL at 12:46

## 2018-08-25 RX ADMIN — ASPIRIN 81 MG 81 MG: 81 TABLET ORAL at 10:35

## 2018-08-25 RX ADMIN — GLIPIZIDE 2.5 MG: 5 TABLET ORAL at 07:05

## 2018-08-25 RX ADMIN — ENOXAPARIN SODIUM 40 MG: 40 INJECTION, SOLUTION INTRAVENOUS; SUBCUTANEOUS at 10:36

## 2018-08-25 RX ADMIN — FLUTICASONE PROPIONATE 1 SPRAY: 50 SPRAY, METERED NASAL at 10:36

## 2018-08-25 RX ADMIN — LABETALOL HYDROCHLORIDE 10 MG: 5 INJECTION, SOLUTION INTRAVENOUS at 12:50

## 2018-08-25 RX ADMIN — SODIUM CHLORIDE, PRESERVATIVE FREE 10 ML: 5 INJECTION INTRAVENOUS at 07:51

## 2018-08-25 RX ADMIN — INSULIN GLARGINE 32 UNITS: 100 INJECTION, SOLUTION SUBCUTANEOUS at 21:10

## 2018-08-25 RX ADMIN — HYDROCHLOROTHIAZIDE 25 MG: 25 TABLET ORAL at 10:35

## 2018-08-25 RX ADMIN — MECLIZINE 12.5 MG: 12.5 TABLET ORAL at 12:46

## 2018-08-25 RX ADMIN — SODIUM CHLORIDE, PRESERVATIVE FREE 10 ML: 5 INJECTION INTRAVENOUS at 11:24

## 2018-08-25 RX ADMIN — INSULIN LISPRO 9 UNITS: 100 INJECTION, SOLUTION INTRAVENOUS; SUBCUTANEOUS at 14:35

## 2018-08-25 RX ADMIN — REGADENOSON 0.4 MG: 0.08 INJECTION, SOLUTION INTRAVENOUS at 09:00

## 2018-08-25 RX ADMIN — SODIUM CHLORIDE, PRESERVATIVE FREE 10 ML: 5 INJECTION INTRAVENOUS at 21:10

## 2018-08-25 RX ADMIN — AMLODIPINE BESYLATE 10 MG: 5 TABLET ORAL at 10:35

## 2018-08-25 RX ADMIN — TETROFOSMIN 33 MILLICURIE: 0.23 INJECTION, POWDER, LYOPHILIZED, FOR SOLUTION INTRAVENOUS at 09:00

## 2018-08-25 RX ADMIN — ACETAMINOPHEN 650 MG: 325 TABLET, FILM COATED ORAL at 22:58

## 2018-08-25 RX ADMIN — MECLIZINE 12.5 MG: 12.5 TABLET ORAL at 22:58

## 2018-08-25 RX ADMIN — CITALOPRAM HYDROBROMIDE 20 MG: 20 TABLET ORAL at 10:35

## 2018-08-25 RX ADMIN — INSULIN LISPRO 6 UNITS: 100 INJECTION, SOLUTION INTRAVENOUS; SUBCUTANEOUS at 18:28

## 2018-08-25 RX ADMIN — SODIUM CHLORIDE, PRESERVATIVE FREE 10 ML: 5 INJECTION INTRAVENOUS at 12:53

## 2018-08-25 ASSESSMENT — PAIN SCALES - GENERAL
PAINLEVEL_OUTOF10: 10
PAINLEVEL_OUTOF10: 8
PAINLEVEL_OUTOF10: 10

## 2018-08-25 ASSESSMENT — PAIN DESCRIPTION - LOCATION: LOCATION: HEAD

## 2018-08-25 ASSESSMENT — PAIN DESCRIPTION - PAIN TYPE: TYPE: ACUTE PAIN

## 2018-08-25 ASSESSMENT — PAIN DESCRIPTION - DESCRIPTORS: DESCRIPTORS: HEADACHE

## 2018-08-25 NOTE — PROGRESS NOTES
A Lexiscan stress test was performed per order. Patient tolerated well, no complaints of chest pain. Awaiting stress imaging at this time.

## 2018-08-25 NOTE — PROGRESS NOTES
Hospitalist Progress Note      PCP: Eliza Berg    Date of Admission: 8/22/2018    Chief Complaint: Vertigo for last 5 days       Hospital Course:53 y.o. female who presented to [facility] with about complaint. She developed what I go 5 days ago. Persistently the area and she saw the doctor today and she was sent to emergency room for further evaluation. Whenever she started with what I go she experiences double vision. He has some visual changes time to time. No change in mental status, headache, fever, focal neurological symptoms over the arms or legs. her appetite is good. She she gets cough when she lies down specially in the night and she uses Flonase  For  nasal congestion. Denies tinnitus or ear pain. Denies chest pain, shortness of breath or syncope     Subjective/Objective: Dizziness has improved. No slurred speech weakness. + occipital headache/ No nausea or vomiting, fatigued.    Still with HTN depite medication      Medications:  Reviewed    Infusion Medications    dextrose      sodium chloride 100 mL/hr at 08/23/18 1538     Scheduled Medications    glipiZIDE  2.5 mg Oral QAM AC    amLODIPine  10 mg Oral Daily    aspirin  81 mg Oral Daily    citalopram  20 mg Oral Daily    fluticasone  1 spray Each Nare Daily    sodium chloride flush  10 mL Intravenous 2 times per day    enoxaparin  40 mg Subcutaneous Daily    insulin lispro  0-18 Units Subcutaneous TID WC    insulin lispro  0-9 Units Subcutaneous Nightly    losartan  100 mg Oral Daily    And    hydrochlorothiazide  25 mg Oral Daily     PRN Meds: hydrALAZINE, glucose, dextrose, glucagon (rDNA), dextrose, acetaminophen, sodium chloride flush, magnesium hydroxide, ondansetron, meclizine      Intake/Output Summary (Last 24 hours) at 08/25/18 1503  Last data filed at 08/24/18 1650   Gross per 24 hour   Intake              420 ml   Output                0 ml   Net              420 ml       Physical Exam Performed:    BP (!) 152/98   Pulse 86   Temp 99.9 °F (37.7 °C) (Oral)   Resp 16   Ht 5' 8\" (1.727 m)   Wt 208 lb 3.2 oz (94.4 kg)   LMP  (LMP Unknown)   SpO2 98%   BMI 31.66 kg/m²     General appearance: No apparent distress, appears stated age and cooperative. Looks like she feels poor   HEENT: Pupils equal, round, and reactive to light. Conjunctivae/corneas clear. Neck: Supple, with full range of motion. No jugular venous distention. Trachea midline. Respiratory:  Normal respiratory effort. Clear to auscultation, bilaterally without Rales/Wheezes/Rhonchi. Cardiovascular: Regular rate and rhythm with normal S1/S2 without murmurs, rubs or gallops. Abdomen: Soft, non-tender, non-distended with normal bowel sounds. Musculoskeletal: No clubbing, cyanosis or edema bilaterally. Full range of motion without deformity. Skin: Skin color, texture, turgor normal.  No rashes or lesions. Neurologic: EOM intact, speech clear 5/5 + nystagmus   Psychiatric: Alert and oriented, thought content appropriate, normal insight  Capillary Refill: Brisk,< 3 seconds   Peripheral Pulses: +2 palpable, equal bilaterally       Labs:   No results for input(s): WBC, HGB, HCT, PLT in the last 72 hours. No results for input(s): NA, K, CL, CO2, BUN, CREATININE, CALCIUM, PHOS in the last 72 hours. Invalid input(s): MAGNES  Recent Labs      08/23/18   0348   AST  146*   ALT  179*   BILIDIR  <0.2   BILITOT  0.4   ALKPHOS  60     No results for input(s): INR in the last 72 hours.   Recent Labs      08/22/18   2316  08/23/18   0348   TROPONINI  <0.01  <0.01       Urinalysis:      Lab Results   Component Value Date    NITRU Negative 08/22/2018    WBCUA 20-50 03/12/2015    BACTERIA 2+ 03/12/2015    RBCUA 0-2 03/12/2015    BLOODU Negative 08/22/2018    SPECGRAV 1.010 08/22/2018    GLUCOSEU >=1000 08/22/2018       Radiology:  NM myocardial SPECT rest exercise or RX   Final Result      VL DUP CAROTID BILATERAL   Final Result      MRI brain without contrast controlled   - FLP reviewed continue prevastatin     Chest Pain with risk factors and history - likely HTN urgency   - will stress to r/o ACS  - cont asa and statin     DVT Prophylaxis: Lovenox   Diet: DIET CARB CONTROL; Carb Control: 4 carb choices (60 gms)/meal; No Added Salt (3-4 GM)  Code Status: Full Code    PT/OT Eval Status: eval and treat     Dispo -  Pending further work up for BP   Kassy Gonzalez, LAUREN - CNP

## 2018-08-25 NOTE — PLAN OF CARE
Problem: Serum Glucose Level - Abnormal:  Goal: Ability to maintain appropriate glucose levels will improve  Ability to maintain appropriate glucose levels will improve   Outcome: Ongoing  ACHS checks, glipezide, and insulin to maintain desired blood glucose levels.

## 2018-08-25 NOTE — PLAN OF CARE
Problem: Falls - Risk of:  Goal: Will remain free from falls  Will remain free from falls   Outcome: Ongoing  Pt is free of falls at this time. Bed is in the lowest position, wheels locked, side rails up x 2, call light, and personal belongings within reach. Patient is independent, pathway Is free clutter. Will continue to monitor.

## 2018-08-25 NOTE — PROGRESS NOTES
[place] [place]  Neurology Follow-up  [facility]    Date of Service: 8/25/2018    Subjective:   CC: Follow up today regarding: Acute dizziness and headache. Events noted. Chart and lab reviewed. The patient continues to have intermittent dizziness with headaches. Blood pressure is still elevated. Most recent blood pressure was 174/100. He denies any chest pain, neck or back pain, weakness or numbness or tingling or trouble with visual disturbance. Line no other new symptoms today. Other review of system was unremarkable. ROS : A 10-12 system review of constitutional, cardiovascular, respiratory, musculoskeletal, endocrine, skin, SHEENT, genitourinary, psychiatric and neurologic systems was obtained and updated today and is unremarkable except as mentioned  in my interval history.        Past Medical History:   Diagnosis Date    Diabetes mellitus ([facility] [address]     Hyperlipidemia     Hypertension      Current Facility-Administered Medications   Medication Dose Route Frequency Provider Last Rate Last Dose    glipiZIDE (GLUCOTROL) tablet 2.5 mg  2.5 mg Oral QAM AC Marguerittemeterio Antonio, APRN - CNP   2.5 mg at 08/25/18 0705    amLODIPine (NORVASC) tablet 10 mg  10 mg Oral Daily Margueritte Collins, APRN - CNP   10 mg at 08/25/18 1035    hydrALAZINE (APRESOLINE) injection 10 mg  10 mg Intravenous Q6H PRN Margueritte Evens, APRN - CNP   10 mg at 08/25/18 1124    glucose (GLUTOSE) 40 % oral gel 15 g  15 g Oral PRN Margueritte Evens, APRN - CNP        dextrose 50 % solution 12.5 g  12.5 g Intravenous PRN Margueritte Evens, APRN - CNP        glucagon (rDNA) injection 1 mg  1 mg Intramuscular PRN Margueritte Evens, APRN - CNP        dextrose 5 % solution  100 mL/hr Intravenous PRN Margueritte Evens, APRN - CNP        0.9 % sodium chloride infusion   Intravenous Continuous Margueritte Evens, APRN -  mL/hr at 08/23/18 1538      acetaminophen (TYLENOL) tablet 650 mg  650 mg Oral Q4H PRN Margueritte Evens, APRN - she has never smoked. She has never used smokeless tobacco. She reports that she does not drink alcohol or use drugs. Objective:  Exam:  Constitutional:   Vitals:    08/25/18 0651 08/25/18 1119 08/25/18 1120 08/25/18 1201   BP: (!) 156/82 (!) 183/117 (!) 186/103 (!) 144/79   Pulse: 68 86  93   Resp: 16 16     Temp: 99.2 °F (37.3 °C) 99.9 °F (37.7 °C)     TempSrc: Oral Oral     SpO2: 98% 98%     Weight:       Height:           General appearance: NAD. Eye: No icterus. PRRR. Neck: supple  Cardiovascular:          No lower leg edema with good pulsation. Mental Status: Oriented to person, place, problem, and time. Fluent speech. Normal attention span and concentration. Cranial Nerves: no change  II: Visual fields: Full to confrontation  III: Pupils: equal, round, reactive to light  III,IV,VI: Extra Ocular Movements are intact. No nystagmus  V: Facial sensation is intact to pin prick and light touch  VII: Facial strength and movements: intact and symmetric smile,cheek puffing and eyebrow elevation  VIII: Hearing: Intact to finger rub bilaterally  IX: Palate elevation is symmetric  XI: Shoulder shrug is intact  XII: Tongue movements are normal  Musculoskeletal: 5/5 in all 4 extremities. Normal tone. Reflexes: Bilateral biceps 2/4, triceps 2/4, brachial radialis 2/4, knee 2/4 and ankle 2/4. Planters: flexor bilaterally. Coordination: no pronator drift, no dysmetria. Finger nose finger testing within normal limits. Sensation: normal to all modalities. Gait/Posture: NT due to headaches.    No change    Data:  LABS:   Lab Results   Component Value Date     08/22/2018    K 4.1 08/22/2018    CL 93 08/22/2018    CO2 27 08/22/2018    BUN 9 08/22/2018    CREATININE <0.5 08/22/2018    GFRAA >60 08/22/2018    LABGLOM >60 08/22/2018    GLUCOSE 368 08/22/2018    CALCIUM 10.0 08/22/2018     Lab Results   Component Value Date    WBC 5.7 08/22/2018    RBC 5.28 08/22/2018    HGB 15.7 08/22/2018    HCT 45.2 08/22/2018    MCV 85.5 08/22/2018    RDW 13.1 08/22/2018     08/22/2018   No results found for: INR, PROTIME  Neuroimaging and/or  labs reviewed by me and discussed results with the patient. CUS showed no severe ICA Stenosis. Impression:    Acute dizziness and vertigo. Possible hypertensive encephalopathy or peripheral vertigo. the same. Hypertension, poorly controlled  Diabetes, poorly controlled. A1c 10  Hyperlipidemia  Depression    Recommendation  Continue the current supportive care for now. Blood pressure control. Continue the current blood pressure medications  Aspirin  Statin  Insulin sliding scale  Hydration  Telemetry  SSRI  Discussed with the patient again risk of poorly controlled hypertension  DC planning when medically stable and BP is controlled  Will follow PRN for now    Nidhi Parish MD   [phone]      This dictation was generated by voice recognition computer software. Although all attempts are made to edit the dictation for accuracy, there may be errors in the transcription that are not intended.

## 2018-08-26 LAB
A/G RATIO: 1.7 (ref 1.1–2.2)
ALBUMIN SERPL-MCNC: 4.3 G/DL (ref 3.4–5)
ALP BLD-CCNC: 62 U/L (ref 40–129)
ALT SERPL-CCNC: 120 U/L (ref 10–40)
ANION GAP SERPL CALCULATED.3IONS-SCNC: 16 MMOL/L (ref 3–16)
AST SERPL-CCNC: 74 U/L (ref 15–37)
BILIRUB SERPL-MCNC: 0.5 MG/DL (ref 0–1)
BUN BLDV-MCNC: 10 MG/DL (ref 7–20)
CALCIUM SERPL-MCNC: 9.7 MG/DL (ref 8.3–10.6)
CHLORIDE BLD-SCNC: 99 MMOL/L (ref 99–110)
CO2: 23 MMOL/L (ref 21–32)
CORTISOL - AM: 9.3 UG/DL (ref 4.3–22.4)
CREAT SERPL-MCNC: <0.5 MG/DL (ref 0.6–1.1)
GFR AFRICAN AMERICAN: >60
GFR NON-AFRICAN AMERICAN: >60
GLOBULIN: 2.5 G/DL
GLUCOSE BLD-MCNC: 169 MG/DL (ref 70–99)
GLUCOSE BLD-MCNC: 186 MG/DL (ref 70–99)
GLUCOSE BLD-MCNC: 233 MG/DL (ref 70–99)
GLUCOSE BLD-MCNC: 245 MG/DL (ref 70–99)
GLUCOSE BLD-MCNC: 274 MG/DL (ref 70–99)
PERFORMED ON: ABNORMAL
POTASSIUM REFLEX MAGNESIUM: 4.3 MMOL/L (ref 3.5–5.1)
SODIUM BLD-SCNC: 138 MMOL/L (ref 136–145)
TOTAL PROTEIN: 6.8 G/DL (ref 6.4–8.2)
TSH REFLEX: 1.75 UIU/ML (ref 0.27–4.2)

## 2018-08-26 PROCEDURE — 6370000000 HC RX 637 (ALT 250 FOR IP): Performed by: NURSE PRACTITIONER

## 2018-08-26 PROCEDURE — 84244 ASSAY OF RENIN: CPT

## 2018-08-26 PROCEDURE — 96372 THER/PROPH/DIAG INJ SC/IM: CPT

## 2018-08-26 PROCEDURE — 83835 ASSAY OF METANEPHRINES: CPT

## 2018-08-26 PROCEDURE — 6370000000 HC RX 637 (ALT 250 FOR IP): Performed by: INTERNAL MEDICINE

## 2018-08-26 PROCEDURE — 82088 ASSAY OF ALDOSTERONE: CPT

## 2018-08-26 PROCEDURE — G0378 HOSPITAL OBSERVATION PER HR: HCPCS

## 2018-08-26 PROCEDURE — 84443 ASSAY THYROID STIM HORMONE: CPT

## 2018-08-26 PROCEDURE — 36415 COLL VENOUS BLD VENIPUNCTURE: CPT

## 2018-08-26 PROCEDURE — 1200000000 HC SEMI PRIVATE

## 2018-08-26 PROCEDURE — 82533 TOTAL CORTISOL: CPT

## 2018-08-26 PROCEDURE — 6360000002 HC RX W HCPCS: Performed by: INTERNAL MEDICINE

## 2018-08-26 PROCEDURE — 2580000003 HC RX 258: Performed by: INTERNAL MEDICINE

## 2018-08-26 PROCEDURE — 80053 COMPREHEN METABOLIC PANEL: CPT

## 2018-08-26 RX ORDER — DEXTROSE MONOHYDRATE 25 G/50ML
12.5 INJECTION, SOLUTION INTRAVENOUS PRN
Status: DISCONTINUED | OUTPATIENT
Start: 2018-08-26 | End: 2018-08-29 | Stop reason: HOSPADM

## 2018-08-26 RX ORDER — DEXTROSE MONOHYDRATE 50 MG/ML
100 INJECTION, SOLUTION INTRAVENOUS PRN
Status: DISCONTINUED | OUTPATIENT
Start: 2018-08-26 | End: 2018-08-29 | Stop reason: HOSPADM

## 2018-08-26 RX ORDER — NICOTINE POLACRILEX 4 MG
15 LOZENGE BUCCAL PRN
Status: DISCONTINUED | OUTPATIENT
Start: 2018-08-26 | End: 2018-08-29 | Stop reason: HOSPADM

## 2018-08-26 RX ADMIN — INSULIN LISPRO 10 UNITS: 100 INJECTION, SOLUTION INTRAVENOUS; SUBCUTANEOUS at 14:03

## 2018-08-26 RX ADMIN — SODIUM CHLORIDE, PRESERVATIVE FREE 10 ML: 5 INJECTION INTRAVENOUS at 21:07

## 2018-08-26 RX ADMIN — INSULIN LISPRO 3 UNITS: 100 INJECTION, SOLUTION INTRAVENOUS; SUBCUTANEOUS at 14:00

## 2018-08-26 RX ADMIN — INSULIN LISPRO 10 UNITS: 100 INJECTION, SOLUTION INTRAVENOUS; SUBCUTANEOUS at 18:41

## 2018-08-26 RX ADMIN — SODIUM CHLORIDE, PRESERVATIVE FREE 10 ML: 5 INJECTION INTRAVENOUS at 14:06

## 2018-08-26 RX ADMIN — INSULIN LISPRO 6 UNITS: 100 INJECTION, SOLUTION INTRAVENOUS; SUBCUTANEOUS at 09:50

## 2018-08-26 RX ADMIN — CITALOPRAM HYDROBROMIDE 20 MG: 20 TABLET ORAL at 08:33

## 2018-08-26 RX ADMIN — INSULIN GLARGINE 32 UNITS: 100 INJECTION, SOLUTION SUBCUTANEOUS at 21:08

## 2018-08-26 RX ADMIN — ACETAMINOPHEN 650 MG: 325 TABLET, FILM COATED ORAL at 15:43

## 2018-08-26 RX ADMIN — MECLIZINE 12.5 MG: 12.5 TABLET ORAL at 15:43

## 2018-08-26 RX ADMIN — LOSARTAN POTASSIUM 100 MG: 100 TABLET, FILM COATED ORAL at 08:33

## 2018-08-26 RX ADMIN — AMLODIPINE BESYLATE 10 MG: 5 TABLET ORAL at 08:33

## 2018-08-26 RX ADMIN — INSULIN LISPRO 2 UNITS: 100 INJECTION, SOLUTION INTRAVENOUS; SUBCUTANEOUS at 21:08

## 2018-08-26 RX ADMIN — HYDROCHLOROTHIAZIDE 25 MG: 25 TABLET ORAL at 08:33

## 2018-08-26 RX ADMIN — INSULIN LISPRO 6 UNITS: 100 INJECTION, SOLUTION INTRAVENOUS; SUBCUTANEOUS at 18:39

## 2018-08-26 RX ADMIN — ENOXAPARIN SODIUM 40 MG: 40 INJECTION, SOLUTION INTRAVENOUS; SUBCUTANEOUS at 08:32

## 2018-08-26 RX ADMIN — MECLIZINE 12.5 MG: 12.5 TABLET ORAL at 21:07

## 2018-08-26 RX ADMIN — ACETAMINOPHEN 650 MG: 325 TABLET, FILM COATED ORAL at 21:07

## 2018-08-26 RX ADMIN — ASPIRIN 81 MG 81 MG: 81 TABLET ORAL at 08:33

## 2018-08-26 RX ADMIN — FLUTICASONE PROPIONATE 1 SPRAY: 50 SPRAY, METERED NASAL at 08:34

## 2018-08-26 ASSESSMENT — PAIN DESCRIPTION - ORIENTATION: ORIENTATION: LEFT

## 2018-08-26 ASSESSMENT — PAIN SCALES - GENERAL
PAINLEVEL_OUTOF10: 9
PAINLEVEL_OUTOF10: 9

## 2018-08-26 ASSESSMENT — PAIN DESCRIPTION - PAIN TYPE: TYPE: ACUTE PAIN

## 2018-08-26 ASSESSMENT — PAIN DESCRIPTION - LOCATION: LOCATION: NECK

## 2018-08-26 NOTE — PLAN OF CARE
Problem: Serum Glucose Level - Abnormal:  Goal: Ability to maintain appropriate glucose levels will improve  Ability to maintain appropriate glucose levels will improve   Outcome: Ongoing  ACHS, Glipizide, Lantus, and insulin.

## 2018-08-26 NOTE — PROGRESS NOTES
670 ml       Physical Exam Performed:    /75   Pulse 67   Temp 98 °F (36.7 °C) (Oral)   Resp 16   Ht 5' 8\" (1.727 m)   Wt 208 lb 3.2 oz (94.4 kg)   LMP  (LMP Unknown)   SpO2 97%   BMI 31.66 kg/m²     General appearance: No apparent distress, appears stated age and cooperative. Looks like she feels poor   HEENT: Pupils equal, round, and reactive to light. Conjunctivae/corneas clear. Neck: Supple, with full range of motion. No jugular venous distention. Trachea midline. Respiratory:  Normal respiratory effort. Clear to auscultation, bilaterally without Rales/Wheezes/Rhonchi. Cardiovascular: Regular rate and rhythm with normal S1/S2 without murmurs, rubs or gallops. Abdomen: Soft, non-tender, non-distended with normal bowel sounds. Musculoskeletal: No clubbing, cyanosis or edema bilaterally. Full range of motion without deformity. Skin: Skin color, texture, turgor normal.  No rashes or lesions. Neurologic: EOM intact, speech clear 5/5 + nystagmus   Psychiatric: Alert and oriented, thought content appropriate, normal insight  Capillary Refill: Brisk,< 3 seconds   Peripheral Pulses: +2 palpable, equal bilaterally       Labs:   No results for input(s): WBC, HGB, HCT, PLT in the last 72 hours. No results for input(s): NA, K, CL, CO2, BUN, CREATININE, CALCIUM, PHOS in the last 72 hours. Invalid input(s): MAGNES  No results for input(s): AST, ALT, BILIDIR, BILITOT, ALKPHOS in the last 72 hours. No results for input(s): INR in the last 72 hours. No results for input(s): Georga Rout in the last 72 hours.     Urinalysis:      Lab Results   Component Value Date    NITRU Negative 08/22/2018    WBCUA 20-50 03/12/2015    BACTERIA 2+ 03/12/2015    RBCUA 0-2 03/12/2015    BLOODU Negative 08/22/2018    SPECGRAV 1.010 08/22/2018    GLUCOSEU >=1000 08/22/2018       Radiology:  NM myocardial SPECT rest exercise or RX   Final Result      VL DUP CAROTID BILATERAL   Final Result      MRI brain without contrast   Final Result   No acute intracranial abnormality. Unremarkable exam of the brain. No intracranial arterial large vessel occlusion. No evidence of an aneurysm. MRA head w/o contrast   Final Result   No acute intracranial abnormality. Unremarkable exam of the brain. No intracranial arterial large vessel occlusion. No evidence of an aneurysm. CT HEAD WO CONTRAST   Final Result   No acute intracranial hemorrhage or mass effect. US GALLBLADDER RUQ   Final Result   Fatty liver         XR CHEST STANDARD (2 VW)   Final Result   No acute findings. VL Renal Arterial Duplex Complete    (Results Pending)           Assessment/Plan:    Active Hospital Problems    Diagnosis Date Noted    Encephalopathy, hypertensive [I67.4]     Dyslipidemia [E78.5]     Vertigo due to cerebrovascular disease [R42] 08/22/2018    Obesity [E66.9] 08/22/2018    DM (diabetes mellitus), type 2 ([facility] [address] [E11.9] 06/25/2015    HTN (hypertension), benign [I10] 06/25/2015    HLD (hyperlipidemia) [E78.5] 06/25/2015     Dizziness and double vision clinically suspect vertigo or hypertensive enceph.  HTN urgency   - MRI/MRA- w/o acute findings   - monitoring on telemetry w/o ectopy   - Neuro eval appreciate input   - ECHO- Gr 1DD  - IVF, antivert  - PT/OT for vestibular therapy   - CUS w/o stenosis   - R/O secondary HTN causes, check am cortisol, renin, aldosterone metaenphrines, tsh, renal duplex r/o AMRIK   -    Diabetes Mellitus- poorly controlled, A1c 10.6   - FSBS ACHS SSI, cont metformin discussed/educated pt,adding Glucotrol- this plan is not progressing will change to lantus and ssi, 8/26 added 10 units prandial dose, tirtrate as neede  - We will order new glucometer and strips at discharge    HTN- poorly controlled 204/91 on arrival -    - continue losartan/ HCTZ and monitor, will add Norvasc 10 mg by mouth daily- improving   - cont asa for cardiac ppx, prn labetalol and hyrdal  - r/o secondary causes     HLD- poorly controlled   - FLP reviewed continue prevastatin     Chest Pain with risk factors and history - likely HTN urgency   - stress w/o ischemia EKG shows 1 mm st depression. With history have asked cardiology to evaluate- appreciate in advance  - cont asa and statin     DVT Prophylaxis: Lovenox   Diet: DIET CARB CONTROL; Carb Control: 4 carb choices (60 gms)/meal; No Added Salt (3-4 GM)  Code Status: Full Code    PT/OT Eval Status: eval and treat     Dispo - If BP stabilizes and BG improved could DC tomorrow, secondary HTN labs will not be available for a while.  She can F/U with PCP  LAUREN Mckoy - CNP

## 2018-08-27 LAB
BLOOD CULTURE, ROUTINE: NORMAL
CULTURE, BLOOD 2: NORMAL
GLUCOSE BLD-MCNC: 166 MG/DL (ref 70–99)
GLUCOSE BLD-MCNC: 180 MG/DL (ref 70–99)
GLUCOSE BLD-MCNC: 190 MG/DL (ref 70–99)
GLUCOSE BLD-MCNC: 200 MG/DL (ref 70–99)
PERFORMED ON: ABNORMAL

## 2018-08-27 PROCEDURE — 6370000000 HC RX 637 (ALT 250 FOR IP): Performed by: INTERNAL MEDICINE

## 2018-08-27 PROCEDURE — G8980 MOBILITY D/C STATUS: HCPCS

## 2018-08-27 PROCEDURE — 2580000003 HC RX 258: Performed by: INTERNAL MEDICINE

## 2018-08-27 PROCEDURE — 1200000000 HC SEMI PRIVATE

## 2018-08-27 PROCEDURE — G0378 HOSPITAL OBSERVATION PER HR: HCPCS

## 2018-08-27 PROCEDURE — 6370000000 HC RX 637 (ALT 250 FOR IP): Performed by: NURSE PRACTITIONER

## 2018-08-27 PROCEDURE — 97116 GAIT TRAINING THERAPY: CPT

## 2018-08-27 PROCEDURE — 96372 THER/PROPH/DIAG INJ SC/IM: CPT

## 2018-08-27 PROCEDURE — 94761 N-INVAS EAR/PLS OXIMETRY MLT: CPT

## 2018-08-27 PROCEDURE — 6360000002 HC RX W HCPCS: Performed by: INTERNAL MEDICINE

## 2018-08-27 PROCEDURE — 93976 VASCULAR STUDY: CPT

## 2018-08-27 RX ADMIN — INSULIN LISPRO 2 UNITS: 100 INJECTION, SOLUTION INTRAVENOUS; SUBCUTANEOUS at 22:09

## 2018-08-27 RX ADMIN — INSULIN LISPRO 10 UNITS: 100 INJECTION, SOLUTION INTRAVENOUS; SUBCUTANEOUS at 13:36

## 2018-08-27 RX ADMIN — ACETAMINOPHEN 650 MG: 325 TABLET, FILM COATED ORAL at 22:07

## 2018-08-27 RX ADMIN — HYDROCHLOROTHIAZIDE 25 MG: 25 TABLET ORAL at 12:42

## 2018-08-27 RX ADMIN — AMLODIPINE BESYLATE 10 MG: 5 TABLET ORAL at 12:39

## 2018-08-27 RX ADMIN — INSULIN LISPRO 10 UNITS: 100 INJECTION, SOLUTION INTRAVENOUS; SUBCUTANEOUS at 18:57

## 2018-08-27 RX ADMIN — LOSARTAN POTASSIUM 100 MG: 100 TABLET, FILM COATED ORAL at 12:41

## 2018-08-27 RX ADMIN — FLUTICASONE PROPIONATE 1 SPRAY: 50 SPRAY, METERED NASAL at 13:36

## 2018-08-27 RX ADMIN — INSULIN GLARGINE 32 UNITS: 100 INJECTION, SOLUTION SUBCUTANEOUS at 22:08

## 2018-08-27 RX ADMIN — CITALOPRAM HYDROBROMIDE 20 MG: 20 TABLET ORAL at 12:40

## 2018-08-27 RX ADMIN — INSULIN LISPRO 3 UNITS: 100 INJECTION, SOLUTION INTRAVENOUS; SUBCUTANEOUS at 18:59

## 2018-08-27 RX ADMIN — SODIUM CHLORIDE, PRESERVATIVE FREE 10 ML: 5 INJECTION INTRAVENOUS at 12:37

## 2018-08-27 RX ADMIN — SODIUM CHLORIDE, PRESERVATIVE FREE 10 ML: 5 INJECTION INTRAVENOUS at 22:22

## 2018-08-27 RX ADMIN — INSULIN LISPRO 3 UNITS: 100 INJECTION, SOLUTION INTRAVENOUS; SUBCUTANEOUS at 12:43

## 2018-08-27 RX ADMIN — ENOXAPARIN SODIUM 40 MG: 40 INJECTION, SOLUTION INTRAVENOUS; SUBCUTANEOUS at 12:41

## 2018-08-27 RX ADMIN — ASPIRIN 81 MG 81 MG: 81 TABLET ORAL at 12:37

## 2018-08-27 ASSESSMENT — PAIN SCALES - GENERAL: PAINLEVEL_OUTOF10: 6

## 2018-08-27 NOTE — FLOWSHEET NOTE
08/27/18 0953   Encounter Summary   Services provided to: Patient   Referral/Consult From: Nurse   Continue Visiting (8-27. states has HCPOA, asked to bring copy.)   Complexity of Encounter Moderate   Length of Encounter 15 minutes   Routine   Type Initial   Assessment Calm; Approachable;Coping   Intervention Active listening;Explored feelings, thoughts, concerns   Outcome Engaged in [employer] (For Healthcare)   Healthcare Directive Yes, patient has an advance directive for healthcare treatment   Type of Healthcare Directive Durable power of  for health care   Copy in Chart No, copy requested from other (See comment)  (asked patient to bring copy if possible)   Advance Directives Documents given;Documents explained   Patient states she already has a health care power of  and does not want to change it. Offered information on living parmar. Asked patient to bring in copy for chart if possible. No present spiritual needs. PRN follow-up. Will be able to help her complete new documents if she desires or is unable to find old ones. Please follow-up with patient to assure copy is obtained.

## 2018-08-27 NOTE — PROGRESS NOTES
Physical Therapy  Facility/Department: [facility] A2 CARD TELEMETRY  Daily Treatment Note  NAME: Milli Eden  : 1965  MRN: [id number]    Date of Service: 2018    Discharge Recommendations:  Home with assist PRN (may benefit from outpt PT for left knee)   PT Equipment Recommendations  Equipment Needed: No  Milli Eden scored a 24/24 on the AM-PAC short mobility form. Pt may benefit from outpatient PT for left knee. Recommend patient returns to prior setting with prior services. Patient Diagnosis(es): The primary encounter diagnosis was Vertigo due to cerebrovascular disease. A diagnosis of Cerebrovascular accident (CVA), unspecified mechanism ([facility] [address] was also pertinent to this visit. has a past medical history of Diabetes mellitus ([facility] [address] Hyperlipidemia; and Hypertension. has a past surgical history that includes Hysterectomy and  section. Restrictions  Restrictions/Precautions  Restrictions/Precautions: General Precautions, Fall Risk  Position Activity Restriction  Other position/activity restrictions: up with assist   Subjective   General  Chart Reviewed: Yes  Family / Caregiver Present: No  Referring Practitioner: Lara Tee  Subjective  Subjective: Agrees to therapy  Pain Screening  Patient Currently in Pain: Denies  Vital Signs  Patient Currently in Pain: Denies       Orientation     Objective   Bed mobility  Supine to Sit: Independent  Sit to Supine: Independent  Transfers  Sit to Stand: Independent  Stand to sit: Independent  Ambulation  Ambulation?: Yes  Ambulation 1  Surface: level tile  Device: No Device  Assistance: Supervision  Quality of Gait: steady pace, no LOB, no c/o dizziness  Distance: 300 ft  Stairs/Curb  Stairs?: Yes  Stairs  # Steps : 12  Stairs Height: 6\"  Rails: Right ascending  Assistance: Supervision  Comment: step-to pattern d/t painful left knee                                 Assessment   Assessment: Pt has met all PT goals.  Pt functioning very close to her baseline/symptoms have resoved. No further inpt PT needs at this time. D/C PT. Pt safe to go home with PRN assist upon D/C.    Patient Education: importance of mobility  No Skilled PT: Safe to return home  REQUIRES PT FOLLOW UP: No  Activity Tolerance  Activity Tolerance: Patient Tolerated treatment well     G-Code  PT G-Codes  Functional Limitation: Mobility: Walking and moving around  Mobility: Walking and Moving Around Discharge Status (): 0 percent impaired, limited or restricted                                                  AM-PAC Score  AM-PAC Inpatient Mobility Raw Score : 24  AM-PAC Inpatient T-Scale Score : 61.14  Mobility Inpatient CMS 0-100% Score: 0  Mobility Inpatient CMS G-Code Modifier : [address]          Goals  Short term goals  Time Frame for Short term goals: 2-4 days  Short term goal 1: Pt to demonstrate modified indep transfers- met  Short term goal 2: pt to amb without device 150 ft supervised- met  Short term goal 3: pt to go up and down 4 stairs with rail: met with SUPV  Short term goal 4: pt to participate in LE Ex 10 x each to improve strength and endurance- met  Patient Goals   Patient goals : \"for my eyes to be better and get rid of dizziness\"    Plan    Plan: D/C PT  Safety Devices  Type of devices: Bed alarm in place, Call light within reach, Left in bed     Therapy Time   Individual Concurrent Group Co-treatment   Time In 1513         Time Out 1536         Minutes 23               Anne Marie Dawkins, PT

## 2018-08-27 NOTE — PROGRESS NOTES
Hospitalist Progress Note      PCP: Norbert Cardona    Date of Admission: 8/22/2018    Chief Complaint: Vertigo for 5 days     Hospital Course:  46 y.o. female who presented to [facility] with above complaint. She developed vertigo 5 days ago. Whenever she started with vertigo she experiences double vision. She has had some visual changes time to time. No change in mental status, headache, fever, focal neurological symptoms over the arms or legs. appetite is good. She she gets cough when she lies down specially in the night, she uses Flonase for nasal congestion. Denies tinnitus or ear pain. Denies chest pain, shortness of breath or syncope     Subjective/Objective:   Dizziness has improved. No slurred speech or weakness. + headache. No nausea or vomiting, fatigued.      Medications:  Reviewed    Infusion Medications    dextrose      dextrose       Scheduled Medications    insulin lispro  10 Units Subcutaneous TID WC    insulin glargine  0.5 Units/kg (Ideal) Subcutaneous Nightly    amLODIPine  10 mg Oral Daily    aspirin  81 mg Oral Daily    citalopram  20 mg Oral Daily    fluticasone  1 spray Each Nare Daily    sodium chloride flush  10 mL Intravenous 2 times per day    enoxaparin  40 mg Subcutaneous Daily    insulin lispro  0-18 Units Subcutaneous TID WC    insulin lispro  0-9 Units Subcutaneous Nightly    losartan  100 mg Oral Daily    And    hydrochlorothiazide  25 mg Oral Daily     PRN Meds: glucose, dextrose, glucagon (rDNA), dextrose, hydrALAZINE, glucose, dextrose, glucagon (rDNA), dextrose, acetaminophen, sodium chloride flush, magnesium hydroxide, ondansetron, meclizine      Intake/Output Summary (Last 24 hours) at 08/27/18 1038  Last data filed at 08/27/18 0559   Gross per 24 hour   Intake              480 ml   Output                0 ml   Net              480 ml       Physical Exam Performed:    /72   Pulse 79   Temp 98.4 °F (36.9 °C) (Oral)   Resp 14   Ht 5' 8\" (1.727 m)   Wt 208 lb 3.2 oz (94.4 kg)   LMP  (LMP Unknown)   SpO2 98%   BMI 31.66 kg/m²     General appearance: No apparent distress, appears stated age and cooperative. Looks like she feels poor   HEENT: Pupils equal, round, and reactive to light. Conjunctivae/corneas clear. Neck: Supple, with full range of motion. No jugular venous distention. Trachea midline. Respiratory:  Normal respiratory effort. Clear to auscultation, bilaterally without Rales/Wheezes/Rhonchi. Cardiovascular: Regular rate and rhythm with normal S1/S2 without murmurs, rubs or gallops. Abdomen: Soft, non-tender, non-distended with normal bowel sounds. Musculoskeletal: No clubbing, cyanosis or edema bilaterally. Full range of motion without deformity. Skin: Skin color, texture, turgor normal.  No rashes or lesions. Neurologic: EOM intact, speech clear 5/5 + nystagmus   Psychiatric: Alert and oriented, thought content appropriate, normal insight  Capillary Refill: Brisk,< 3 seconds   Peripheral Pulses: +2 palpable, equal bilaterally       Labs:     Recent Labs      08/26/18   0816   NA  138   K  4.3   CL  99   CO2  23   BUN  10   CREATININE  <0.5*   CALCIUM  9.7     Recent Labs      08/26/18   0816   AST  74*   ALT  120*   BILITOT  0.5   ALKPHOS  62     Urinalysis:      Lab Results   Component Value Date    NITRU Negative 08/22/2018    WBCUA 20-50 03/12/2015    BACTERIA 2+ 03/12/2015    RBCUA 0-2 03/12/2015    BLOODU Negative 08/22/2018    SPECGRAV 1.010 08/22/2018    GLUCOSEU >=1000 08/22/2018       Radiology:  NM myocardial SPECT rest exercise or RX   Final Result      VL DUP CAROTID BILATERAL   Final Result      MRI brain without contrast   Final Result   No acute intracranial abnormality. Unremarkable exam of the brain. No intracranial arterial large vessel occlusion. No evidence of an aneurysm. MRA head w/o contrast   Final Result   No acute intracranial abnormality. Unremarkable exam of the brain.       No intracranial arterial large vessel occlusion. No evidence of an aneurysm. CT HEAD WO CONTRAST   Final Result   No acute intracranial hemorrhage or mass effect. US GALLBLADDER RUQ   Final Result   Fatty liver         XR CHEST STANDARD (2 VW)   Final Result   No acute findings.          VL Renal Arterial Duplex Complete    (Results Pending)           Assessment/Plan:    Active Hospital Problems    Diagnosis Date Noted    Encephalopathy, hypertensive [I67.4]     Dyslipidemia [E78.5]     Vertigo due to cerebrovascular disease [R42] 08/22/2018    Obesity [E66.9] 08/22/2018    DM (diabetes mellitus), type 2 ([facility] [address] [E11.9] 06/25/2015    HTN (hypertension), benign [I10] 06/25/2015    HLD (hyperlipidemia) [E78.5] 06/25/2015       Dizziness and double vision clinically suspect vertigo or hypertensive encephalopathy/urgency:    - MRI/MRA are without acute findings   - Carotid ultrasound without stenosis   - Monitoring on telemetry - no ectopy noted  - Neurology consulted   - ECHO with normal LVEF 55%, no RWMAs, G1DD  - Continue antivert as needed   - PT/OT for vestibular therapy   - R/O secondary HTN causes, am cortisol WNL, renin and aldosterone metanephrines are pending, TSH WNL, renal duplex r/o AMRIK     Diabetes mellitus type 2 - poorly controlled, A1c 10.6:   - FSBS ACHS SSI, on metformin at home - on hold while inpt - pt currently on lantus and SSI and 10 units prandial dose, tirtrate as needed  - We will order new glucometer and strips at discharge    HTN - poorly controlled 204/91 on arrival:   - Continue losartan/ HCTZ and Norvasc 10 mg by mouth daily  - Continue asa for cardiac ppx, prn labetalol and hydralazine   - Ruling out secondary causes as above     HLD - poorly controlled:  - FLP reviewed - continue prevastatin     Chest Pain with risk factors and history - likely HTN urgency   - Stress w/o ischemia EKG shows 1 mm st depression.   - Continue asa and statin   - Cardiology consulted, GXT is negative DVT Prophylaxis: Lovenox   Diet: DIET CARB CONTROL; Carb Control: 4 carb choices (60 gms)/meal; No Added Salt (3-4 GM)  Code Status: Full Code    PT/OT Eval Status: eval and treat     Dispo - Likely tomorrow am     [address], APRN - CNP

## 2018-08-28 ENCOUNTER — APPOINTMENT (OUTPATIENT)
Dept: GENERAL RADIOLOGY | Age: 53
DRG: 052 | End: 2018-08-28
Payer: MEDICAID

## 2018-08-28 LAB
ALDOSTERONE: 7.8 NG/DL
ANION GAP SERPL CALCULATED.3IONS-SCNC: 13 MMOL/L (ref 3–16)
BACTERIA: ABNORMAL /HPF
BILIRUBIN URINE: NEGATIVE
BLOOD, URINE: NEGATIVE
BUN BLDV-MCNC: 11 MG/DL (ref 7–20)
CALCIUM SERPL-MCNC: 9.8 MG/DL (ref 8.3–10.6)
CHLORIDE BLD-SCNC: 95 MMOL/L (ref 99–110)
CLARITY: CLEAR
CO2: 26 MMOL/L (ref 21–32)
COLOR: YELLOW
CREAT SERPL-MCNC: <0.5 MG/DL (ref 0.6–1.1)
EPITHELIAL CELLS, UA: ABNORMAL /HPF
GFR AFRICAN AMERICAN: >60
GFR NON-AFRICAN AMERICAN: >60
GLUCOSE BLD-MCNC: 126 MG/DL (ref 70–99)
GLUCOSE BLD-MCNC: 185 MG/DL (ref 70–99)
GLUCOSE BLD-MCNC: 209 MG/DL (ref 70–99)
GLUCOSE BLD-MCNC: 222 MG/DL (ref 70–99)
GLUCOSE BLD-MCNC: 281 MG/DL (ref 70–99)
GLUCOSE URINE: 100 MG/DL
HCT VFR BLD CALC: 44.7 % (ref 36–48)
HEMOGLOBIN: 15.2 G/DL (ref 12–16)
KETONES, URINE: NEGATIVE MG/DL
LEUKOCYTE ESTERASE, URINE: ABNORMAL
MCH RBC QN AUTO: 29.3 PG (ref 26–34)
MCHC RBC AUTO-ENTMCNC: 33.9 G/DL (ref 31–36)
MCV RBC AUTO: 86.2 FL (ref 80–100)
MICROSCOPIC EXAMINATION: YES
NITRITE, URINE: NEGATIVE
PDW BLD-RTO: 13.5 % (ref 12.4–15.4)
PERFORMED ON: ABNORMAL
PH UA: 7
PLATELET # BLD: 240 K/UL (ref 135–450)
PMV BLD AUTO: 8.8 FL (ref 5–10.5)
POTASSIUM REFLEX MAGNESIUM: 4.4 MMOL/L (ref 3.5–5.1)
PROTEIN UA: NEGATIVE MG/DL
RBC # BLD: 5.18 M/UL (ref 4–5.2)
RBC UA: ABNORMAL /HPF (ref 0–2)
SODIUM BLD-SCNC: 134 MMOL/L (ref 136–145)
SPECIFIC GRAVITY UA: 1.01
URINE REFLEX TO CULTURE: YES
URINE TYPE: ABNORMAL
UROBILINOGEN, URINE: 0.2 E.U./DL
WBC # BLD: 6.3 K/UL (ref 4–11)
WBC UA: ABNORMAL /HPF (ref 0–5)

## 2018-08-28 PROCEDURE — 80048 BASIC METABOLIC PNL TOTAL CA: CPT

## 2018-08-28 PROCEDURE — 6370000000 HC RX 637 (ALT 250 FOR IP): Performed by: INTERNAL MEDICINE

## 2018-08-28 PROCEDURE — 71045 X-RAY EXAM CHEST 1 VIEW: CPT

## 2018-08-28 PROCEDURE — 87086 URINE CULTURE/COLONY COUNT: CPT

## 2018-08-28 PROCEDURE — G0378 HOSPITAL OBSERVATION PER HR: HCPCS

## 2018-08-28 PROCEDURE — 85027 COMPLETE CBC AUTOMATED: CPT

## 2018-08-28 PROCEDURE — 36415 COLL VENOUS BLD VENIPUNCTURE: CPT

## 2018-08-28 PROCEDURE — 1200000000 HC SEMI PRIVATE

## 2018-08-28 PROCEDURE — 6370000000 HC RX 637 (ALT 250 FOR IP): Performed by: NURSE PRACTITIONER

## 2018-08-28 PROCEDURE — 2580000003 HC RX 258: Performed by: INTERNAL MEDICINE

## 2018-08-28 PROCEDURE — 81001 URINALYSIS AUTO W/SCOPE: CPT

## 2018-08-28 RX ADMIN — ACETAMINOPHEN 650 MG: 325 TABLET, FILM COATED ORAL at 22:18

## 2018-08-28 RX ADMIN — INSULIN LISPRO 3 UNITS: 100 INJECTION, SOLUTION INTRAVENOUS; SUBCUTANEOUS at 17:32

## 2018-08-28 RX ADMIN — HYDROCHLOROTHIAZIDE 25 MG: 25 TABLET ORAL at 10:11

## 2018-08-28 RX ADMIN — CITALOPRAM HYDROBROMIDE 20 MG: 20 TABLET ORAL at 10:11

## 2018-08-28 RX ADMIN — LOSARTAN POTASSIUM 100 MG: 100 TABLET, FILM COATED ORAL at 10:11

## 2018-08-28 RX ADMIN — INSULIN LISPRO 10 UNITS: 100 INJECTION, SOLUTION INTRAVENOUS; SUBCUTANEOUS at 13:59

## 2018-08-28 RX ADMIN — INSULIN LISPRO 6 UNITS: 100 INJECTION, SOLUTION INTRAVENOUS; SUBCUTANEOUS at 10:28

## 2018-08-28 RX ADMIN — AMLODIPINE BESYLATE 10 MG: 5 TABLET ORAL at 10:11

## 2018-08-28 RX ADMIN — SODIUM CHLORIDE, PRESERVATIVE FREE 10 ML: 5 INJECTION INTRAVENOUS at 20:48

## 2018-08-28 RX ADMIN — ASPIRIN 81 MG 81 MG: 81 TABLET ORAL at 10:10

## 2018-08-28 RX ADMIN — INSULIN LISPRO 3 UNITS: 100 INJECTION, SOLUTION INTRAVENOUS; SUBCUTANEOUS at 20:46

## 2018-08-28 RX ADMIN — ACETAMINOPHEN 650 MG: 325 TABLET, FILM COATED ORAL at 16:15

## 2018-08-28 RX ADMIN — INSULIN GLARGINE 32 UNITS: 100 INJECTION, SOLUTION SUBCUTANEOUS at 20:47

## 2018-08-28 RX ADMIN — FLUTICASONE PROPIONATE 1 SPRAY: 50 SPRAY, METERED NASAL at 10:11

## 2018-08-28 RX ADMIN — SODIUM CHLORIDE, PRESERVATIVE FREE 10 ML: 5 INJECTION INTRAVENOUS at 10:11

## 2018-08-28 RX ADMIN — INSULIN LISPRO 10 UNITS: 100 INJECTION, SOLUTION INTRAVENOUS; SUBCUTANEOUS at 10:29

## 2018-08-28 RX ADMIN — INSULIN LISPRO 10 UNITS: 100 INJECTION, SOLUTION INTRAVENOUS; SUBCUTANEOUS at 17:34

## 2018-08-28 ASSESSMENT — PAIN SCALES - GENERAL
PAINLEVEL_OUTOF10: 9
PAINLEVEL_OUTOF10: 0
PAINLEVEL_OUTOF10: 9
PAINLEVEL_OUTOF10: 8
PAINLEVEL_OUTOF10: 0
PAINLEVEL_OUTOF10: 0

## 2018-08-28 ASSESSMENT — PAIN DESCRIPTION - PAIN TYPE: TYPE: ACUTE PAIN

## 2018-08-28 ASSESSMENT — PAIN DESCRIPTION - LOCATION: LOCATION: HEAD

## 2018-08-28 NOTE — PLAN OF CARE
Problem: Serum Glucose Level - Abnormal:  Goal: Ability to maintain appropriate glucose levels will improve  Ability to maintain appropriate glucose levels will improve   Outcome: Ongoing  Pt at risk for elevated blood glucose levels r/t DM. Pt will have glucose levels monitored prior to breakfast, lunch, dinner, and bedtime. Elevated levels will be treated via SSI. Pt understands the need to monitor levels and has no further complaints at this time.

## 2018-08-28 NOTE — PROGRESS NOTES
End of shift report given to [facility] at bedside. Patient alert and oriented. Bed in lowest position with wheels locked. Call light within reach, no further needs at this time.

## 2018-08-28 NOTE — PROGRESS NOTES
Hospitalist Progress Note      PCP: Michael Sanchez    Date of Admission: 8/22/2018    Chief Complaint: Vertigo for 5 days     Hospital Course:  46 y.o. female who presented to [facility] with above complaint. She developed vertigo 5 days ago. Whenever she started with vertigo she experiences double vision. She has had some visual changes time to time. No change in mental status, headache, fever, focal neurological symptoms over the arms or legs. appetite is good. She she gets cough when she lies down specially in the night, she uses Flonase for nasal congestion. Denies tinnitus or ear pain. Denies chest pain, shortness of breath or syncope     Subjective/Objective:   Pt reports feeling shaky this morning. Tmax of 100.5 this morning. She denies dyspnea or chest pain. No cough or sputum production. No N/V/D. Reports foul smelling urine.      Medications:  Reviewed    Infusion Medications    dextrose      dextrose       Scheduled Medications    insulin lispro  10 Units Subcutaneous TID WC    insulin glargine  0.5 Units/kg (Ideal) Subcutaneous Nightly    amLODIPine  10 mg Oral Daily    aspirin  81 mg Oral Daily    citalopram  20 mg Oral Daily    fluticasone  1 spray Each Nare Daily    sodium chloride flush  10 mL Intravenous 2 times per day    enoxaparin  40 mg Subcutaneous Daily    insulin lispro  0-18 Units Subcutaneous TID WC    insulin lispro  0-9 Units Subcutaneous Nightly    losartan  100 mg Oral Daily    And    hydrochlorothiazide  25 mg Oral Daily     PRN Meds: glucose, dextrose, glucagon (rDNA), dextrose, hydrALAZINE, glucose, dextrose, glucagon (rDNA), dextrose, acetaminophen, sodium chloride flush, magnesium hydroxide, ondansetron, meclizine      Intake/Output Summary (Last 24 hours) at 08/28/18 1327  Last data filed at 08/28/18 1054   Gross per 24 hour   Intake              840 ml   Output             1200 ml   Net             -360 ml       Physical Exam Performed:    /83 Pulse 81   Temp 98.7 °F (37.1 °C) (Oral)   Resp 18   Ht 5' 8\" (1.727 m)   Wt 208 lb 3.2 oz (94.4 kg)   LMP  (LMP Unknown)   SpO2 98%   BMI 31.66 kg/m²     General appearance: No apparent distress, appears stated age and cooperative. Looks like she feels poor   HEENT: Pupils equal, round, and reactive to light. Conjunctivae/corneas clear. Neck: Supple, with full range of motion. No jugular venous distention. Trachea midline. Respiratory:  Normal respiratory effort. Clear to auscultation, bilaterally without Rales/Wheezes/Rhonchi. Cardiovascular: Regular rate and rhythm with normal S1/S2 without murmurs, rubs or gallops. Abdomen: Soft, non-tender, non-distended with normal bowel sounds. Musculoskeletal: No clubbing, cyanosis or edema bilaterally. Full range of motion without deformity. Skin: Skin color, texture, turgor normal.  No rashes or lesions. Neurologic: EOM intact, speech clear 5/5 + nystagmus   Psychiatric: Alert and oriented, thought content appropriate, normal insight  Capillary Refill: Brisk,< 3 seconds   Peripheral Pulses: +2 palpable, equal bilaterally       Labs:     Recent Labs      08/26/18   0816  08/28/18   1013   NA  138  134*   K  4.3  4.4   CL  99  95*   CO2  23  26   BUN  10  11   CREATININE  <0.5*  <0.5*   CALCIUM  9.7  9.8     Recent Labs      08/26/18   0816   AST  74*   ALT  120*   BILITOT  0.5   ALKPHOS  62     Urinalysis:      Lab Results   Component Value Date    NITRU Negative 08/22/2018    WBCUA 20-50 03/12/2015    BACTERIA 2+ 03/12/2015    RBCUA 0-2 03/12/2015    BLOODU Negative 08/22/2018    SPECGRAV 1.010 08/22/2018    GLUCOSEU >=1000 08/22/2018       Radiology:  XR CHEST PORTABLE   Final Result   No radiographic evidence of acute cardiopulmonary disease.          VL Renal Arterial Duplex Complete   Final Result      NM myocardial SPECT rest exercise or RX   Final Result      VL DUP CAROTID BILATERAL   Final Result      MRI brain without contrast   Final Result   No controlled:  - FLP reviewed - continue prevastatin     Chest Pain with risk factors and history - likely HTN urgency   - Stress w/o ischemia EKG shows 1 mm st depression.   - Continue asa and statin   - Cardiology consulted, GXT is negative     Fever:  - Obtain CBC and BMP, check UA with culture and CXR. Pt clinically without evidence of pneumonia. She reports foul smelling urine.      DVT Prophylaxis: Lovenox   Diet: DIET CARB CONTROL; Carb Control: 4 carb choices (60 gms)/meal; No Added Salt (3-4 GM)  Code Status: Full Code    PT/OT Eval Status: eval and treat     Dispo - Possibly tomorrow     [address], APRN - CNP

## 2018-08-28 NOTE — PLAN OF CARE
Problem: Serum Glucose Level - Abnormal:  Intervention: Manage insulin injections  Educated on proper insulin administration technique.  Patient did all the steps independently with writers supervision

## 2018-08-29 VITALS
HEART RATE: 76 BPM | WEIGHT: 208.2 LBS | BODY MASS INDEX: 31.55 KG/M2 | RESPIRATION RATE: 15 BRPM | OXYGEN SATURATION: 97 % | HEIGHT: 68 IN | SYSTOLIC BLOOD PRESSURE: 124 MMHG | TEMPERATURE: 97.4 F | DIASTOLIC BLOOD PRESSURE: 78 MMHG

## 2018-08-29 LAB
GLUCOSE BLD-MCNC: 212 MG/DL (ref 70–99)
METANEPH/PLASMA INTERP: NORMAL
METANEPHRINE FREE PLASMA: <0.1 NMOL/L (ref 0–0.49)
NORMETANEPHRINE FREE PLASMA: 0.37 NMOL/L (ref 0–0.89)
PERFORMED ON: ABNORMAL
RENIN ACTIVITY: 1 NG/ML/HR

## 2018-08-29 PROCEDURE — 96372 THER/PROPH/DIAG INJ SC/IM: CPT

## 2018-08-29 PROCEDURE — 6370000000 HC RX 637 (ALT 250 FOR IP): Performed by: NURSE PRACTITIONER

## 2018-08-29 PROCEDURE — 6360000002 HC RX W HCPCS: Performed by: INTERNAL MEDICINE

## 2018-08-29 PROCEDURE — 2580000003 HC RX 258: Performed by: INTERNAL MEDICINE

## 2018-08-29 PROCEDURE — 6370000000 HC RX 637 (ALT 250 FOR IP): Performed by: INTERNAL MEDICINE

## 2018-08-29 PROCEDURE — G0378 HOSPITAL OBSERVATION PER HR: HCPCS

## 2018-08-29 RX ORDER — LANCETS 30 GAUGE
EACH MISCELLANEOUS
Qty: 100 EACH | Refills: 3 | Status: SHIPPED | OUTPATIENT
Start: 2018-08-29

## 2018-08-29 RX ORDER — GLUCOSAMINE HCL/CHONDROITIN SU 500-400 MG
1 CAPSULE ORAL
Qty: 100 STRIP | Refills: 3 | Status: SHIPPED | OUTPATIENT
Start: 2018-08-29

## 2018-08-29 RX ORDER — AMLODIPINE BESYLATE 10 MG/1
10 TABLET ORAL DAILY
Qty: 30 TABLET | Refills: 3 | Status: SHIPPED | OUTPATIENT
Start: 2018-08-30

## 2018-08-29 RX ORDER — INSULIN GLARGINE 100 [IU]/ML
0.5 INJECTION, SOLUTION SUBCUTANEOUS NIGHTLY
Qty: 1 VIAL | Refills: 3 | Status: SHIPPED | OUTPATIENT
Start: 2018-08-29

## 2018-08-29 RX ADMIN — ASPIRIN 81 MG 81 MG: 81 TABLET ORAL at 08:54

## 2018-08-29 RX ADMIN — CITALOPRAM HYDROBROMIDE 20 MG: 20 TABLET ORAL at 08:53

## 2018-08-29 RX ADMIN — SODIUM CHLORIDE, PRESERVATIVE FREE 10 ML: 5 INJECTION INTRAVENOUS at 08:54

## 2018-08-29 RX ADMIN — INSULIN LISPRO 6 UNITS: 100 INJECTION, SOLUTION INTRAVENOUS; SUBCUTANEOUS at 08:58

## 2018-08-29 RX ADMIN — ENOXAPARIN SODIUM 40 MG: 40 INJECTION, SOLUTION INTRAVENOUS; SUBCUTANEOUS at 08:54

## 2018-08-29 RX ADMIN — INSULIN LISPRO 10 UNITS: 100 INJECTION, SOLUTION INTRAVENOUS; SUBCUTANEOUS at 08:55

## 2018-08-29 RX ADMIN — AMLODIPINE BESYLATE 10 MG: 5 TABLET ORAL at 08:53

## 2018-08-29 RX ADMIN — LOSARTAN POTASSIUM 100 MG: 100 TABLET, FILM COATED ORAL at 08:53

## 2018-08-29 RX ADMIN — HYDROCHLOROTHIAZIDE 25 MG: 25 TABLET ORAL at 08:53

## 2018-08-29 ASSESSMENT — PAIN SCALES - GENERAL: PAINLEVEL_OUTOF10: 0

## 2018-08-29 NOTE — CARE COORDINATION
CASE MANAGEMENT DISCHARGE SUMMARY      Discharge to: Home with no services or needs from case management    Transportation:    Family/car: On arrival    Notified:    RN: Margret      Note: Discharging nurse to complete LEEANN, reconcile AVS, and place final copy with patient's discharge packet. RN to ensure that written prescriptions for  Level II medications are sent with patient to the facility as per protocol.

## 2018-08-29 NOTE — DISCHARGE SUMMARY
insight  Capillary Refill: Brisk,< 3 seconds   Peripheral Pulses: +2 palpable, equal bilaterally       Labs: For convenience and continuity at follow-up the following most recent labs are provided:      CBC:    Lab Results   Component Value Date    WBC 6.3 08/28/2018    HGB 15.2 08/28/2018    HCT 44.7 08/28/2018     08/28/2018       Renal:    Lab Results   Component Value Date     08/28/2018    K 4.4 08/28/2018    CL 95 08/28/2018    CO2 26 08/28/2018    BUN 11 08/28/2018    CREATININE <0.5 08/28/2018    CALCIUM 9.8 08/28/2018         Significant Diagnostic Studies    Radiology:   XR CHEST PORTABLE   Final Result   No radiographic evidence of acute cardiopulmonary disease. VL Renal Arterial Duplex Complete   Final Result      NM myocardial SPECT rest exercise or RX   Final Result      VL DUP CAROTID BILATERAL   Final Result      MRI brain without contrast   Final Result   No acute intracranial abnormality. Unremarkable exam of the brain. No intracranial arterial large vessel occlusion. No evidence of an aneurysm. MRA head w/o contrast   Final Result   No acute intracranial abnormality. Unremarkable exam of the brain. No intracranial arterial large vessel occlusion. No evidence of an aneurysm. CT HEAD WO CONTRAST   Final Result   No acute intracranial hemorrhage or mass effect. US GALLBLADDER RUQ   Final Result   Fatty liver         XR CHEST STANDARD (2 VW)   Final Result   No acute findings.                 Consults:     IP CONSULT TO HOSPITALIST  IP CONSULT TO NEUROLOGY  IP CONSULT TO SPIRITUAL SERVICES  IP CONSULT TO CARDIOLOGY    Disposition:  Home     Condition at Discharge: Stable    Discharge Instructions/Follow-up:  Follow-up with PCP     Code Status:  Full Code     Activity: activity as tolerated    Diet: diabetic diet      Discharge Medications:     Current Discharge Medication List           Details   Lancets MISC Diabetes mellitus  Qty: 100 each,

## 2018-08-30 LAB
EKG ATRIAL RATE: 81 BPM
EKG DIAGNOSIS: NORMAL
EKG P AXIS: 33 DEGREES
EKG P-R INTERVAL: 162 MS
EKG Q-T INTERVAL: 408 MS
EKG QRS DURATION: 100 MS
EKG QTC CALCULATION (BAZETT): 473 MS
EKG R AXIS: -45 DEGREES
EKG T AXIS: 43 DEGREES
EKG VENTRICULAR RATE: 81 BPM
URINE CULTURE, ROUTINE: NORMAL

## 2018-10-31 LAB
AVERAGE GLUCOSE: NORMAL
HBA1C MFR BLD: 5.5 %

## 2019-01-03 ENCOUNTER — HOSPITAL ENCOUNTER (OUTPATIENT)
Dept: MAMMOGRAPHY | Age: 54
Discharge: HOME | End: 2019-01-03
Payer: MEDICAID

## 2019-01-03 DIAGNOSIS — Z12.39 BREAST CANCER SCREENING: ICD-10-CM

## 2019-01-03 PROCEDURE — 77063 BREAST TOMOSYNTHESIS BI: CPT

## 2019-01-18 ENCOUNTER — HOSPITAL ENCOUNTER (OUTPATIENT)
Dept: MAMMOGRAPHY | Age: 54
Discharge: HOME | End: 2019-01-18
Payer: MEDICAID

## 2019-01-18 DIAGNOSIS — R92.8 ABNORMAL MAMMOGRAM: ICD-10-CM

## 2019-01-18 PROCEDURE — G0279 TOMOSYNTHESIS, MAMMO: HCPCS

## 2019-02-01 ENCOUNTER — HOSPITAL ENCOUNTER (OUTPATIENT)
Dept: WOMENS IMAGING | Age: 54
Discharge: HOME | End: 2019-02-01
Payer: MEDICAID

## 2019-02-01 DIAGNOSIS — Z98.890 STATUS POST BREAST BIOPSY: ICD-10-CM

## 2019-02-01 DIAGNOSIS — R92.8 ABNORMAL MAMMOGRAM: ICD-10-CM

## 2019-02-01 PROCEDURE — 77065 DX MAMMO INCL CAD UNI: CPT

## 2019-02-01 PROCEDURE — 76098 X-RAY EXAM SURGICAL SPECIMEN: CPT

## 2019-02-01 PROCEDURE — 19081 BX BREAST 1ST LESION STRTCTC: CPT

## 2019-02-01 PROCEDURE — 19082 BX BREAST ADD LESION STRTCTC: CPT

## 2019-02-01 PROCEDURE — 88342 IMHCHEM/IMCYTCHM 1ST ANTB: CPT

## 2019-02-01 PROCEDURE — 88360 TUMOR IMMUNOHISTOCHEM/MANUAL: CPT

## 2019-02-01 PROCEDURE — 88305 TISSUE EXAM BY PATHOLOGIST: CPT

## 2019-02-06 ENCOUNTER — TELEPHONE (OUTPATIENT)
Dept: WOMENS IMAGING | Age: 54
End: 2019-02-06

## 2019-02-06 NOTE — TELEPHONE ENCOUNTER
Pathology for breast biopsy complete, radiologist confirms concordance. Contacted patient with breast biopsy results. Dr. Michael Low recommendation of breast surgeon consultation relayed to patient. Path report faxed to Sonia Driscoll. Pt would like to speak with her  and get back to me on what breast surgeon she would prefer. Packet containing educational information regarding breast cancer sent to patient. Nurse Navigator will remain available for any questions. Pt verbalized understanding.   Maeve Luu RN

## 2019-03-15 ENCOUNTER — HOSPITAL ENCOUNTER (OUTPATIENT)
Age: 54
Discharge: HOME | End: 2019-03-15
Payer: MEDICAID

## 2019-03-15 LAB
EKG ATRIAL RATE: 65 BPM
EKG DIAGNOSIS: NORMAL
EKG P AXIS: 16 DEGREES
EKG P-R INTERVAL: 134 MS
EKG Q-T INTERVAL: 432 MS
EKG QRS DURATION: 94 MS
EKG QTC CALCULATION (BAZETT): 449 MS
EKG R AXIS: -22 DEGREES
EKG T AXIS: 44 DEGREES
EKG VENTRICULAR RATE: 65 BPM

## 2019-03-15 PROCEDURE — 93005 ELECTROCARDIOGRAM TRACING: CPT | Performed by: SURGERY

## 2019-03-15 PROCEDURE — 93010 ELECTROCARDIOGRAM REPORT: CPT | Performed by: INTERNAL MEDICINE

## 2019-04-23 ENCOUNTER — APPOINTMENT (OUTPATIENT)
Dept: GENERAL RADIOLOGY | Age: 54
End: 2019-04-23
Attending: SURGERY
Payer: MEDICAID

## 2019-04-23 ENCOUNTER — HOSPITAL ENCOUNTER (OUTPATIENT)
Age: 54
Setting detail: OUTPATIENT SURGERY
Discharge: HOME | End: 2019-04-23
Attending: SURGERY | Admitting: SURGERY
Payer: MEDICAID

## 2019-04-23 ENCOUNTER — ANESTHESIA (OUTPATIENT)
Dept: OPERATING ROOM | Age: 54
End: 2019-04-23
Payer: MEDICAID

## 2019-04-23 ENCOUNTER — ANESTHESIA EVENT (OUTPATIENT)
Dept: OPERATING ROOM | Age: 54
End: 2019-04-23
Payer: MEDICAID

## 2019-04-23 VITALS
DIASTOLIC BLOOD PRESSURE: 68 MMHG | HEART RATE: 88 BPM | BODY MASS INDEX: 31.52 KG/M2 | OXYGEN SATURATION: 98 % | WEIGHT: 208 LBS | TEMPERATURE: 97 F | SYSTOLIC BLOOD PRESSURE: 148 MMHG | RESPIRATION RATE: 16 BRPM | HEIGHT: 68 IN

## 2019-04-23 VITALS — SYSTOLIC BLOOD PRESSURE: 137 MMHG | DIASTOLIC BLOOD PRESSURE: 77 MMHG | OXYGEN SATURATION: 98 %

## 2019-04-23 DIAGNOSIS — G89.18 ACUTE POSTOPERATIVE PAIN: Primary | ICD-10-CM

## 2019-04-23 LAB
GLUCOSE BLD-MCNC: 101 MG/DL (ref 70–99)
GLUCOSE BLD-MCNC: 137 MG/DL (ref 70–99)
LV EF: 65 %
LVEF MODALITY: NORMAL
PERFORMED ON: ABNORMAL
PERFORMED ON: ABNORMAL

## 2019-04-23 PROCEDURE — 6360000002 HC RX W HCPCS: Performed by: NURSE ANESTHETIST, CERTIFIED REGISTERED

## 2019-04-23 PROCEDURE — 2500000003 HC RX 250 WO HCPCS: Performed by: SURGERY

## 2019-04-23 PROCEDURE — 7100000010 HC PHASE II RECOVERY - FIRST 15 MIN: Performed by: SURGERY

## 2019-04-23 PROCEDURE — 2580000003 HC RX 258: Performed by: ANESTHESIOLOGY

## 2019-04-23 PROCEDURE — 2580000003 HC RX 258: Performed by: SURGERY

## 2019-04-23 PROCEDURE — 3600000004 HC SURGERY LEVEL 4 BASE: Performed by: SURGERY

## 2019-04-23 PROCEDURE — 7100000011 HC PHASE II RECOVERY - ADDTL 15 MIN: Performed by: SURGERY

## 2019-04-23 PROCEDURE — 3700000001 HC ADD 15 MINUTES (ANESTHESIA): Performed by: SURGERY

## 2019-04-23 PROCEDURE — C8929 TTE W OR WO FOL WCON,DOPPLER: HCPCS

## 2019-04-23 PROCEDURE — 6360000002 HC RX W HCPCS: Performed by: SURGERY

## 2019-04-23 PROCEDURE — 3209999900 FLUORO FOR SURGICAL PROCEDURES

## 2019-04-23 PROCEDURE — 6360000004 HC RX CONTRAST MEDICATION: Performed by: INTERNAL MEDICINE

## 2019-04-23 PROCEDURE — 7100000000 HC PACU RECOVERY - FIRST 15 MIN: Performed by: SURGERY

## 2019-04-23 PROCEDURE — 71045 X-RAY EXAM CHEST 1 VIEW: CPT

## 2019-04-23 PROCEDURE — 3600000014 HC SURGERY LEVEL 4 ADDTL 15MIN: Performed by: SURGERY

## 2019-04-23 PROCEDURE — 2709999900 HC NON-CHARGEABLE SUPPLY: Performed by: SURGERY

## 2019-04-23 PROCEDURE — 7100000001 HC PACU RECOVERY - ADDTL 15 MIN: Performed by: SURGERY

## 2019-04-23 PROCEDURE — 77001 FLUOROGUIDE FOR VEIN DEVICE: CPT

## 2019-04-23 PROCEDURE — C1788 PORT, INDWELLING, IMP: HCPCS | Performed by: SURGERY

## 2019-04-23 PROCEDURE — 2500000003 HC RX 250 WO HCPCS: Performed by: NURSE ANESTHETIST, CERTIFIED REGISTERED

## 2019-04-23 PROCEDURE — 3700000000 HC ANESTHESIA ATTENDED CARE: Performed by: SURGERY

## 2019-04-23 DEVICE — PORT INFUS 6FR PLAS PWR INJ ATTCH POLYUR CATH SIL FILL SUT: Type: IMPLANTABLE DEVICE | Site: CHEST | Status: FUNCTIONAL

## 2019-04-23 RX ORDER — BUPIVACAINE HYDROCHLORIDE 5 MG/ML
INJECTION, SOLUTION EPIDURAL; INTRACAUDAL PRN
Status: DISCONTINUED | OUTPATIENT
Start: 2019-04-23 | End: 2019-04-23 | Stop reason: ALTCHOICE

## 2019-04-23 RX ORDER — SODIUM CHLORIDE, SODIUM LACTATE, POTASSIUM CHLORIDE, CALCIUM CHLORIDE 600; 310; 30; 20 MG/100ML; MG/100ML; MG/100ML; MG/100ML
INJECTION, SOLUTION INTRAVENOUS CONTINUOUS
Status: DISCONTINUED | OUTPATIENT
Start: 2019-04-23 | End: 2019-04-23 | Stop reason: HOSPADM

## 2019-04-23 RX ORDER — HEPARIN SODIUM (PORCINE) LOCK FLUSH IV SOLN 100 UNIT/ML 100 UNIT/ML
SOLUTION INTRAVENOUS PRN
Status: DISCONTINUED | OUTPATIENT
Start: 2019-04-23 | End: 2019-04-23 | Stop reason: ALTCHOICE

## 2019-04-23 RX ORDER — FENTANYL CITRATE 50 UG/ML
INJECTION, SOLUTION INTRAMUSCULAR; INTRAVENOUS PRN
Status: DISCONTINUED | OUTPATIENT
Start: 2019-04-23 | End: 2019-04-23 | Stop reason: SDUPTHER

## 2019-04-23 RX ORDER — PROPOFOL 10 MG/ML
INJECTION, EMULSION INTRAVENOUS PRN
Status: DISCONTINUED | OUTPATIENT
Start: 2019-04-23 | End: 2019-04-23 | Stop reason: SDUPTHER

## 2019-04-23 RX ORDER — MIDAZOLAM HYDROCHLORIDE 1 MG/ML
INJECTION INTRAMUSCULAR; INTRAVENOUS PRN
Status: DISCONTINUED | OUTPATIENT
Start: 2019-04-23 | End: 2019-04-23 | Stop reason: SDUPTHER

## 2019-04-23 RX ORDER — HYDROCODONE BITARTRATE AND ACETAMINOPHEN 5; 325 MG/1; MG/1
1 TABLET ORAL EVERY 6 HOURS PRN
Qty: 10 TABLET | Refills: 0 | Status: SHIPPED | OUTPATIENT
Start: 2019-04-23 | End: 2019-04-26

## 2019-04-23 RX ORDER — LIDOCAINE HYDROCHLORIDE 20 MG/ML
INJECTION, SOLUTION INFILTRATION; PERINEURAL PRN
Status: DISCONTINUED | OUTPATIENT
Start: 2019-04-23 | End: 2019-04-23 | Stop reason: SDUPTHER

## 2019-04-23 RX ADMIN — FENTANYL CITRATE 25 MCG: 50 INJECTION INTRAMUSCULAR; INTRAVENOUS at 09:44

## 2019-04-23 RX ADMIN — PROPOFOL 60 MG: 10 INJECTION, EMULSION INTRAVENOUS at 09:13

## 2019-04-23 RX ADMIN — SODIUM CHLORIDE, POTASSIUM CHLORIDE, SODIUM LACTATE AND CALCIUM CHLORIDE: 600; 310; 30; 20 INJECTION, SOLUTION INTRAVENOUS at 09:41

## 2019-04-23 RX ADMIN — MIDAZOLAM HYDROCHLORIDE 1 MG: 2 INJECTION, SOLUTION INTRAMUSCULAR; INTRAVENOUS at 09:00

## 2019-04-23 RX ADMIN — PROPOFOL 30 MG: 10 INJECTION, EMULSION INTRAVENOUS at 09:36

## 2019-04-23 RX ADMIN — PERFLUTREN 2.2 MG: 6.52 INJECTION, SUSPENSION INTRAVENOUS at 14:05

## 2019-04-23 RX ADMIN — PROPOFOL 50 MG: 10 INJECTION, EMULSION INTRAVENOUS at 09:19

## 2019-04-23 RX ADMIN — SODIUM CHLORIDE, POTASSIUM CHLORIDE, SODIUM LACTATE AND CALCIUM CHLORIDE: 600; 310; 30; 20 INJECTION, SOLUTION INTRAVENOUS at 07:43

## 2019-04-23 RX ADMIN — PROPOFOL 100 MG: 10 INJECTION, EMULSION INTRAVENOUS at 09:08

## 2019-04-23 RX ADMIN — SODIUM CHLORIDE, POTASSIUM CHLORIDE, SODIUM LACTATE AND CALCIUM CHLORIDE: 600; 310; 30; 20 INJECTION, SOLUTION INTRAVENOUS at 08:57

## 2019-04-23 RX ADMIN — FENTANYL CITRATE 25 MCG: 50 INJECTION INTRAMUSCULAR; INTRAVENOUS at 10:00

## 2019-04-23 RX ADMIN — Medication 1.5 G: at 09:17

## 2019-04-23 RX ADMIN — LIDOCAINE HYDROCHLORIDE 60 MG: 20 INJECTION, SOLUTION INFILTRATION; PERINEURAL at 09:08

## 2019-04-23 RX ADMIN — PROPOFOL 50 MG: 10 INJECTION, EMULSION INTRAVENOUS at 09:32

## 2019-04-23 RX ADMIN — Medication 1.5 G: at 07:55

## 2019-04-23 RX ADMIN — PROPOFOL 40 MG: 10 INJECTION, EMULSION INTRAVENOUS at 09:49

## 2019-04-23 RX ADMIN — PROPOFOL 30 MG: 10 INJECTION, EMULSION INTRAVENOUS at 09:40

## 2019-04-23 RX ADMIN — FENTANYL CITRATE 25 MCG: 50 INJECTION INTRAMUSCULAR; INTRAVENOUS at 09:00

## 2019-04-23 RX ADMIN — PROPOFOL 30 MG: 10 INJECTION, EMULSION INTRAVENOUS at 09:04

## 2019-04-23 RX ADMIN — PROPOFOL 50 MG: 10 INJECTION, EMULSION INTRAVENOUS at 09:23

## 2019-04-23 RX ADMIN — FENTANYL CITRATE 25 MCG: 50 INJECTION INTRAMUSCULAR; INTRAVENOUS at 09:28

## 2019-04-23 RX ADMIN — PROPOFOL 30 MG: 10 INJECTION, EMULSION INTRAVENOUS at 08:59

## 2019-04-23 RX ADMIN — PROPOFOL 30 MG: 10 INJECTION, EMULSION INTRAVENOUS at 09:45

## 2019-04-23 ASSESSMENT — PULMONARY FUNCTION TESTS
PIF_VALUE: 1
PIF_VALUE: 0
PIF_VALUE: 1
PIF_VALUE: 0
PIF_VALUE: 1
PIF_VALUE: 1
PIF_VALUE: 0
PIF_VALUE: 1
PIF_VALUE: 0
PIF_VALUE: 1
PIF_VALUE: 0
PIF_VALUE: 1
PIF_VALUE: 0
PIF_VALUE: 1
PIF_VALUE: 1

## 2019-04-23 ASSESSMENT — PAIN SCALES - GENERAL
PAINLEVEL_OUTOF10: 0

## 2019-04-23 ASSESSMENT — PAIN - FUNCTIONAL ASSESSMENT: PAIN_FUNCTIONAL_ASSESSMENT: 0-10

## 2019-04-23 NOTE — ANESTHESIA POSTPROCEDURE EVALUATION
Department of Anesthesiology  Postprocedure Note    Patient: Bishop Gordon  MRN: [id number]  YOB: 1965  Date of evaluation: 4/23/2019  Time:  12:56 PM     Procedure Summary     Date:  04/23/19 Room / Location:  [address] [place] [address] [facility] OR    Anesthesia Start:  8992 Anesthesia Stop:  4082    Procedure:  PORT PLACEMENT (Right Chest) Diagnosis:       Poor venous access      Malignant neoplasm of upper-inner quadrant of left female breast, unspecified estrogen receptor status ([address]      (LEFT BREAST CANCER, POOR VENOUS ACCESS)    Surgeon:  Keyana Alberts MD Responsible Provider:  Johny Parra MD    Anesthesia Type:  MAC ASA Status:  3          Anesthesia Type: MAC    Marine Phase I: Marine Score: 10    Marine Phase II: Marine Score: 10    Last vitals: Reviewed and per EMR flowsheets.        Anesthesia Post Evaluation    Comments: Postoperative Anesthesia Note    Name:    Bishop Gordon  MRN:      [id number]    Patient Vitals in the past 12 hrs:  04/23/19 1050, BP:(!) 148/68, Pulse:88, Resp:16, SpO2:98 %  04/23/19 1035, BP:(!) 143/73, Temp:97 °F (36.1 °C), Pulse:83, Resp:16, SpO2:97 %  04/23/19 1020, BP:(!) 145/83, Pulse:85, Resp:16, SpO2:97 %  04/23/19 1015, BP:(!) 123/95, Pulse:86, Resp:16, SpO2:96 %  04/23/19 1010, BP:(!) 147/92, Temp:97.6 °F (36.4 °C), Pulse:88, Resp:16, SpO2:93 %  04/23/19 0723, BP:(!) 156/78, Temp:97.5 °F (36.4 °C), Temp src:Temporal, Pulse:87, Resp:16, SpO2:99 %, Height:5' 8\" (1.727 m), Weight:208 lb (94.3 kg)     LABS:    CBC  Lab Results       Component                Value               Date/Time                  WBC                      6.3                 08/28/2018 10:13 AM        HGB                      15.2                08/28/2018 10:13 AM        HCT                      44.7                08/28/2018 10:13 AM        PLT                      240                 08/28/2018 10:13 AM   RENAL  Lab Results       Component                Value               Date/Time

## 2019-04-23 NOTE — ANESTHESIA PRE PROCEDURE
Medication Dose Route Frequency Provider Last Rate Last Dose    vancomycin 1.5 g in dextrose 5% 300 mL IVPB  1,500 mg Intravenous On Call to [facility] Sandy Martinez  mL/hr at 19 0755 1.5 g at 19 0755    lactated ringers infusion   Intravenous Continuous Humberto Thomas  mL/hr at 19 0743         Allergies: Allergies   Allergen Reactions    Atorvastatin Nausea Only    Codeine     Penicillins        Problem List:    Patient Active Problem List   Diagnosis Code    DM (diabetes mellitus), type 2 ([facility] [address] E11.9    HTN (hypertension), benign I10    HLD (hyperlipidemia) E78.5    Vertigo due to cerebrovascular disease R42    Obesity E66.9    Encephalopathy, hypertensive I67.4    Dyslipidemia E78.5       Past Medical History:        Diagnosis Date    Cancer ([facility] [address]     left breast    Diabetes mellitus ([facility] [address]     Hyperlipidemia     Hypertension        Past Surgical History:        Procedure Laterality Date    BREAST SURGERY Bilateral 2019    mastectomy     SECTION      X 3    HYSTERECTOMY      LAPAROSCOPY      TUBAL LIGATION         Social History:    Social History     Tobacco Use    Smoking status: Never Smoker    Smokeless tobacco: Never Used   Substance Use Topics    Alcohol use:  Yes     Alcohol/week: 0.0 oz     Comment: 1-2 drinks per year                                Counseling given: Not Answered      Vital Signs (Current):   Vitals:    19 0933 19 0723   BP:  (!) 156/78   Pulse:  87   Resp:  16   Temp:  97.5 °F (36.4 °C)   TempSrc:  Temporal   SpO2:  99%   Weight: 208 lb (94.3 kg) 208 lb (94.3 kg)   Height: 5' 8\" (1.727 m) 5' 8\" (1.727 m)                                              BP Readings from Last 3 Encounters:   19 (!) 156/78   18 124/78   16 124/82       NPO Status: Time of last liquid consumption:                         Time of last solid consumption:                         Date of last liquid

## 2019-04-23 NOTE — H&P
H&P Update    Patient's History and Physical from April 16, Dr Aida Amos was reviewed. Patient examined. Vitals:    04/23/19 0723   BP: (!) 156/78   Pulse: 87   Resp: 16   Temp: 97.5 °F (36.4 °C)   SpO2: 99%         There has been no change.  Ok to proceed with surgery    Reena Monterroso

## 2019-04-23 NOTE — PROGRESS NOTES
Report  Color good warm  And dry respirations easy         sugical site dry and intact   Color good warm and dry respirations easy

## 2019-04-23 NOTE — PROGRESS NOTES
POCT      Blood Glucose: 101      (Normal Range 70-99)    PT:      (Normal Range 9.8 - 13)    INR:      (Normal Range 0.86-1.14)

## 2019-05-29 ENCOUNTER — FOLLOWUP TELEPHONE ENCOUNTER (OUTPATIENT)
Dept: WOMENS IMAGING | Age: 54
End: 2019-05-29

## 2019-05-29 NOTE — PROGRESS NOTES
MIGUELANGEL spoke with [place] regarding referral to us from [facility] for and financial assistance or identification of community resources. Gave her contact information a couple weeks ago for [employer] for meal assistance during chemotherapy and that is underway. Today gave her additional contact information for Ger [employer], [employer] all local and [facility] org at a national level to explore potential assistance. Also gave contact information for MIGUELANGEL Gardner at [facility] in case there are some local resources particular to [facility].

## 2019-07-26 ENCOUNTER — HOSPITAL ENCOUNTER (OUTPATIENT)
Dept: CARDIOLOGY | Age: 54
Discharge: HOME | End: 2019-07-26
Payer: MEDICAID

## 2019-07-26 LAB
LV EF: 58 %
LVEF MODALITY: NORMAL

## 2019-07-26 PROCEDURE — 93306 TTE W/DOPPLER COMPLETE: CPT

## 2019-08-19 ENCOUNTER — APPOINTMENT (OUTPATIENT)
Dept: NON INVASIVE DIAGNOSTICS | Age: 54
End: 2019-08-19
Payer: MEDICAID

## 2019-08-19 ENCOUNTER — HOSPITAL ENCOUNTER (OUTPATIENT)
Dept: WOMENS IMAGING | Age: 54
Discharge: HOME | End: 2019-08-19
Payer: MEDICAID

## 2019-08-19 DIAGNOSIS — Z12.31 ENCOUNTER FOR SCREENING MAMMOGRAM FOR BREAST CANCER: ICD-10-CM

## 2019-08-19 PROCEDURE — 77080 DXA BONE DENSITY AXIAL: CPT

## 2019-08-26 ENCOUNTER — HOSPITAL ENCOUNTER (OUTPATIENT)
Dept: VASCULAR LAB | Age: 54
Discharge: HOME | End: 2019-08-26
Payer: MEDICAID

## 2019-08-26 PROCEDURE — 93970 EXTREMITY STUDY: CPT

## 2019-11-22 ENCOUNTER — HOSPITAL ENCOUNTER (OUTPATIENT)
Dept: MRI IMAGING | Age: 54
Discharge: HOME | End: 2019-11-22
Payer: MEDICAID

## 2019-11-22 ENCOUNTER — HOSPITAL ENCOUNTER (OUTPATIENT)
Age: 54
Discharge: HOME | End: 2019-11-22
Payer: MEDICAID

## 2019-11-22 DIAGNOSIS — E78.5 DYSLIPIDEMIA: ICD-10-CM

## 2019-11-22 DIAGNOSIS — R42 VERTIGO DUE TO CEREBROVASCULAR DISEASE: ICD-10-CM

## 2019-11-22 DIAGNOSIS — I67.9 VERTIGO DUE TO CEREBROVASCULAR DISEASE: ICD-10-CM

## 2019-11-22 DIAGNOSIS — I67.4 ENCEPHALOPATHY, HYPERTENSIVE: ICD-10-CM

## 2019-11-22 LAB
A/G RATIO: 1.3 (ref 1.1–2.2)
ALBUMIN SERPL-MCNC: 4.4 G/DL (ref 3.4–5)
ALP BLD-CCNC: 82 U/L (ref 40–129)
ALT SERPL-CCNC: 29 U/L (ref 10–40)
ANION GAP SERPL CALCULATED.3IONS-SCNC: 11 MMOL/L (ref 3–16)
AST SERPL-CCNC: 28 U/L (ref 15–37)
BILIRUB SERPL-MCNC: 0.4 MG/DL (ref 0–1)
BUN BLDV-MCNC: 11 MG/DL (ref 7–20)
CALCIUM SERPL-MCNC: 9.9 MG/DL (ref 8.3–10.6)
CHLORIDE BLD-SCNC: 103 MMOL/L (ref 99–110)
CO2: 26 MMOL/L (ref 21–32)
CREAT SERPL-MCNC: <0.5 MG/DL (ref 0.6–1.1)
GFR AFRICAN AMERICAN: >60
GFR NON-AFRICAN AMERICAN: >60
GLOBULIN: 3.3 G/DL
GLUCOSE BLD-MCNC: 97 MG/DL (ref 70–99)
POTASSIUM SERPL-SCNC: 4.1 MMOL/L (ref 3.5–5.1)
SODIUM BLD-SCNC: 140 MMOL/L (ref 136–145)
TOTAL PROTEIN: 7.7 G/DL (ref 6.4–8.2)

## 2019-11-22 PROCEDURE — A9579 GAD-BASE MR CONTRAST NOS,1ML: HCPCS | Performed by: INTERNAL MEDICINE

## 2019-11-22 PROCEDURE — 72156 MRI NECK SPINE W/O & W/DYE: CPT

## 2019-11-22 PROCEDURE — 36415 COLL VENOUS BLD VENIPUNCTURE: CPT

## 2019-11-22 PROCEDURE — 80053 COMPREHEN METABOLIC PANEL: CPT

## 2019-11-22 PROCEDURE — 6360000004 HC RX CONTRAST MEDICATION: Performed by: INTERNAL MEDICINE

## 2019-11-22 RX ADMIN — GADOTERIDOL 19 ML: 279.3 INJECTION, SOLUTION INTRAVENOUS at 16:28

## 2019-11-25 ENCOUNTER — HOSPITAL ENCOUNTER (OUTPATIENT)
Dept: MRI IMAGING | Age: 54
Discharge: HOME | End: 2019-11-25
Payer: MEDICAID

## 2019-11-25 DIAGNOSIS — M79.601 RIGHT ARM PAIN: ICD-10-CM

## 2019-11-25 DIAGNOSIS — C50.312 MALIGNANT NEOPLASM OF LOWER-INNER QUADRANT OF LEFT FEMALE BREAST, UNSPECIFIED ESTROGEN RECEPTOR STATUS (HCC): ICD-10-CM

## 2019-11-25 PROCEDURE — A9579 GAD-BASE MR CONTRAST NOS,1ML: HCPCS | Performed by: INTERNAL MEDICINE

## 2019-11-25 PROCEDURE — 70553 MRI BRAIN STEM W/O & W/DYE: CPT

## 2019-11-25 PROCEDURE — 6360000004 HC RX CONTRAST MEDICATION: Performed by: INTERNAL MEDICINE

## 2019-11-25 RX ADMIN — GADOTERIDOL 20 ML: 279.3 INJECTION, SOLUTION INTRAVENOUS at 16:42

## 2019-12-18 ENCOUNTER — HOSPITAL ENCOUNTER (OUTPATIENT)
Dept: CARDIOLOGY | Age: 54
Discharge: HOME | End: 2019-12-18
Payer: MEDICAID

## 2019-12-18 LAB
LV EF: 55 %
LVEF MODALITY: NORMAL

## 2019-12-18 PROCEDURE — 93306 TTE W/DOPPLER COMPLETE: CPT

## 2020-02-03 ENCOUNTER — HOSPITAL ENCOUNTER (OUTPATIENT)
Dept: MRI IMAGING | Age: 55
Discharge: HOME | End: 2020-02-03
Payer: MEDICAID

## 2020-02-03 PROCEDURE — 6360000004 HC RX CONTRAST MEDICATION: Performed by: PHYSICIAN ASSISTANT

## 2020-02-03 PROCEDURE — 70553 MRI BRAIN STEM W/O & W/DYE: CPT

## 2020-02-03 PROCEDURE — A9579 GAD-BASE MR CONTRAST NOS,1ML: HCPCS | Performed by: PHYSICIAN ASSISTANT

## 2020-02-03 RX ADMIN — GADOTERIDOL 18 ML: 279.3 INJECTION, SOLUTION INTRAVENOUS at 12:59

## 2020-02-14 ENCOUNTER — HOSPITAL ENCOUNTER (OUTPATIENT)
Dept: MRI IMAGING | Age: 55
Discharge: HOME | End: 2020-02-14
Payer: MEDICAID

## 2020-02-14 PROCEDURE — A9579 GAD-BASE MR CONTRAST NOS,1ML: HCPCS | Performed by: INTERNAL MEDICINE

## 2020-02-14 PROCEDURE — 6360000004 HC RX CONTRAST MEDICATION: Performed by: INTERNAL MEDICINE

## 2020-02-14 PROCEDURE — 73223 MRI JOINT UPR EXTR W/O&W/DYE: CPT

## 2020-02-14 RX ADMIN — GADOTERIDOL 20 ML: 279.3 INJECTION, SOLUTION INTRAVENOUS at 11:32

## 2020-04-07 ENCOUNTER — HOSPITAL ENCOUNTER (OUTPATIENT)
Dept: CARDIOLOGY | Age: 55
Discharge: HOME | End: 2020-04-07
Payer: MEDICAID

## 2020-04-07 PROCEDURE — 93308 TTE F-UP OR LMTD: CPT

## 2020-07-24 ENCOUNTER — HOSPITAL ENCOUNTER (OUTPATIENT)
Dept: CARDIOLOGY | Age: 55
Discharge: HOME | End: 2020-07-24
Payer: MEDICARE

## 2020-07-24 LAB
LV EF: 58 %
LVEF MODALITY: NORMAL

## 2020-07-24 PROCEDURE — 93308 TTE F-UP OR LMTD: CPT

## 2020-09-29 ENCOUNTER — HOSPITAL ENCOUNTER (OUTPATIENT)
Dept: WOMENS IMAGING | Age: 55
Discharge: HOME | End: 2020-09-29
Payer: MEDICARE

## 2020-09-29 PROCEDURE — 76642 ULTRASOUND BREAST LIMITED: CPT

## 2021-09-01 ENCOUNTER — HOSPITAL ENCOUNTER (OUTPATIENT)
Dept: ULTRASOUND IMAGING | Age: 56
Discharge: HOME | End: 2021-09-01
Payer: MEDICARE

## 2021-09-01 DIAGNOSIS — K76.0 FATTY LIVER: ICD-10-CM

## 2021-09-01 PROCEDURE — 76705 ECHO EXAM OF ABDOMEN: CPT

## 2021-10-19 ENCOUNTER — HOSPITAL ENCOUNTER (OUTPATIENT)
Dept: GENERAL RADIOLOGY | Age: 56
Discharge: HOME | End: 2021-10-19
Payer: MEDICARE

## 2021-10-19 DIAGNOSIS — R13.10 DYSPHAGIA, UNSPECIFIED TYPE: ICD-10-CM

## 2021-10-19 PROCEDURE — 74230 X-RAY XM SWLNG FUNCJ C+: CPT

## 2021-10-19 PROCEDURE — 92611 MOTION FLUOROSCOPY/SWALLOW: CPT

## 2021-10-19 NOTE — PROCEDURES
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 Apparent cricopharyngeus may be contributing to pt's reported symptoms? · Recommend continued GI and ENT f/u      NAME: Fernando Orozco   : 1965  MRN: [id number]       Date of Eval: 10/19/2021     Ordering Physician: Adeola Pat MD  Radiologist: Dr. Zuleyka Scott     Referring Diagnosis(es): Referring Diagnosis: Dysphagia, unspecified type    Past Medical History:  has a past medical history of Cancer ([facility] [address] Diabetes mellitus ([facility] [address] Hyperlipidemia, and Hypertension. Past Surgical History:  has a past surgical history that includes Hysterectomy;  section; Tubal ligation; laparoscopy; Breast surgery (Bilateral, 2019); and INSERTION / REMOVAL / REPLACEMENT VENOUS ACCESS CATHETER (Right, 2019). Current Diet Solid Consistency: Regular  Current Diet Liquid Consistency: Thin    Date of Prior Study: n/a  Type of Study: Initial MBS  Results of Prior Study: n/a    Recent CXR/CT of Chest: n/a    Patient Complaints/Reason for Referral:  Fernando Orozco was referred for a MBS to assess the efficiency of his/her swallow function, assess for aspiration, and to make recommendations regarding safe dietary consistencies, effective compensatory strategies, and safe eating environment. Patient complaints: Pt endorses dysphagia / globus sensation with meats, breads, as well as, some fruits and vegetables.   Pt also reported intermittent difficulty swallowing

## 2021-12-06 ENCOUNTER — HOSPITAL ENCOUNTER (OUTPATIENT)
Dept: GENERAL RADIOLOGY | Age: 56
Discharge: HOME | End: 2021-12-06
Payer: MEDICARE

## 2021-12-06 DIAGNOSIS — R13.12 OROPHARYNGEAL DYSPHAGIA: ICD-10-CM

## 2021-12-06 PROCEDURE — 74220 X-RAY XM ESOPHAGUS 1CNTRST: CPT

## 2021-12-20 ENCOUNTER — HOSPITAL ENCOUNTER (OUTPATIENT)
Dept: ULTRASOUND IMAGING | Age: 56
Discharge: HOME | End: 2021-12-20
Payer: MEDICARE

## 2021-12-20 DIAGNOSIS — R10.11 ABDOMINAL PAIN, RIGHT UPPER QUADRANT: ICD-10-CM

## 2021-12-20 PROCEDURE — 76705 ECHO EXAM OF ABDOMEN: CPT

## 2022-01-10 ENCOUNTER — HOSPITAL ENCOUNTER (OUTPATIENT)
Dept: NUCLEAR MEDICINE | Age: 57
Discharge: HOME | End: 2022-01-10
Payer: MEDICARE

## 2022-01-10 DIAGNOSIS — R10.11 ABDOMINAL PAIN, RIGHT UPPER QUADRANT: ICD-10-CM

## 2022-01-10 PROCEDURE — 78227 HEPATOBIL SYST IMAGE W/DRUG: CPT

## 2022-01-10 PROCEDURE — 3430000000 HC RX DIAGNOSTIC RADIOPHARMACEUTICAL: Performed by: INTERNAL MEDICINE

## 2022-01-10 PROCEDURE — A9537 TC99M MEBROFENIN: HCPCS | Performed by: INTERNAL MEDICINE

## 2022-01-10 RX ADMIN — Medication 6.1 MILLICURIE: at 10:01

## 2022-03-28 ENCOUNTER — HOSPITAL ENCOUNTER (OUTPATIENT)
Dept: MRI IMAGING | Age: 57
Discharge: HOME | End: 2022-03-28
Payer: MEDICARE

## 2022-03-28 DIAGNOSIS — M54.2 CERVICALGIA: ICD-10-CM

## 2022-03-28 DIAGNOSIS — G56.40 CAUSALGIA OF UPPER EXTREMITY, UNSPECIFIED LATERALITY: ICD-10-CM

## 2022-03-28 PROCEDURE — 72141 MRI NECK SPINE W/O DYE: CPT

## 2022-05-20 ENCOUNTER — HOSPITAL ENCOUNTER (OUTPATIENT)
Dept: MRI IMAGING | Age: 57
Discharge: HOME | End: 2022-05-20
Payer: MEDICARE

## 2022-05-20 DIAGNOSIS — M51.16 NEURITIS OR RADICULITIS DUE TO RUPTURE OF LUMBAR INTERVERTEBRAL DISC: ICD-10-CM

## 2022-05-20 PROCEDURE — 72148 MRI LUMBAR SPINE W/O DYE: CPT

## 2023-05-30 LAB
BASOPHILS # BLD: 0 K/UL (ref 0–0.2)
BASOPHILS NFR BLD: 0.5 %
DEPRECATED RDW RBC AUTO: 14.5 % (ref 12.4–15.4)
EOSINOPHIL # BLD: 0.4 K/UL (ref 0–0.6)
EOSINOPHIL NFR BLD: 5.5 %
HCT VFR BLD AUTO: 37.7 % (ref 36–48)
HGB BLD-MCNC: 12.5 G/DL (ref 12–16)
LYMPHOCYTES # BLD: 2 K/UL (ref 1–5.1)
LYMPHOCYTES NFR BLD: 26.8 %
MCH RBC QN AUTO: 29.2 PG (ref 26–34)
MCHC RBC AUTO-ENTMCNC: 33.2 G/DL (ref 31–36)
MCV RBC AUTO: 87.7 FL (ref 80–100)
MONOCYTES # BLD: 0.5 K/UL (ref 0–1.3)
MONOCYTES NFR BLD: 6.4 %
NEUTROPHILS # BLD: 4.5 K/UL (ref 1.7–7.7)
NEUTROPHILS NFR BLD: 60.8 %
PLATELET # BLD AUTO: 239 K/UL (ref 135–450)
PMV BLD AUTO: 8.9 FL (ref 5–10.5)
RBC # BLD AUTO: 4.3 M/UL (ref 4–5.2)
WBC # BLD AUTO: 7.4 K/UL (ref 4–11)

## 2023-05-31 LAB
ALBUMIN SERPL-MCNC: 4.5 G/DL (ref 3.4–5)
ALBUMIN/GLOB SERPL: 1.9 {RATIO} (ref 1.1–2.2)
ALP SERPL-CCNC: 74 U/L (ref 40–129)
ALT SERPL-CCNC: 22 U/L (ref 10–40)
ANION GAP SERPL CALCULATED.3IONS-SCNC: 16 MMOL/L (ref 3–16)
AST SERPL-CCNC: 26 U/L (ref 15–37)
BILIRUB SERPL-MCNC: 0.3 MG/DL (ref 0–1)
BUN SERPL-MCNC: 10 MG/DL (ref 7–20)
CALCIUM SERPL-MCNC: 9.4 MG/DL (ref 8.3–10.6)
CHLORIDE SERPL-SCNC: 99 MMOL/L (ref 99–110)
CO2 SERPL-SCNC: 25 MMOL/L (ref 21–32)
CREAT SERPL-MCNC: 0.6 MG/DL (ref 0.6–1.1)
CRP SERPL-MCNC: 18.3 MG/L (ref 0–5.1)
GFR SERPLBLD CREATININE-BSD FMLA CKD-EPI: >60 ML/MIN/{1.73_M2}
GLUCOSE SERPL-MCNC: 124 MG/DL (ref 70–99)
POTASSIUM SERPL-SCNC: 3.9 MMOL/L (ref 3.5–5.1)
PROT SERPL-MCNC: 6.9 G/DL (ref 6.4–8.2)
SODIUM SERPL-SCNC: 140 MMOL/L (ref 136–145)

## 2024-08-09 ENCOUNTER — HOSPITAL ENCOUNTER (EMERGENCY)
Age: 59
Discharge: HOME | End: 2024-08-09
Payer: MEDICARE

## 2024-08-09 ENCOUNTER — APPOINTMENT (OUTPATIENT)
Dept: CT IMAGING | Age: 59
End: 2024-08-09
Payer: MEDICARE

## 2024-08-09 ENCOUNTER — APPOINTMENT (OUTPATIENT)
Dept: ULTRASOUND IMAGING | Age: 59
End: 2024-08-09
Payer: MEDICARE

## 2024-08-09 VITALS
HEART RATE: 78 BPM | RESPIRATION RATE: 18 BRPM | OXYGEN SATURATION: 99 % | WEIGHT: 200 LBS | HEIGHT: 68 IN | SYSTOLIC BLOOD PRESSURE: 150 MMHG | TEMPERATURE: 99.2 F | DIASTOLIC BLOOD PRESSURE: 78 MMHG | BODY MASS INDEX: 30.31 KG/M2

## 2024-08-09 DIAGNOSIS — R10.12 LEFT UPPER QUADRANT ABDOMINAL PAIN: Primary | ICD-10-CM

## 2024-08-09 DIAGNOSIS — R11.0 NAUSEA: ICD-10-CM

## 2024-08-09 LAB
ALBUMIN SERPL-MCNC: 4.9 G/DL (ref 3.4–5)
ALBUMIN/GLOB SERPL: 1.7 {RATIO} (ref 1.1–2.2)
ALP SERPL-CCNC: 113 U/L (ref 40–129)
ALT SERPL-CCNC: 65 U/L (ref 10–40)
ANION GAP SERPL CALCULATED.3IONS-SCNC: 12 MMOL/L (ref 3–16)
AST SERPL-CCNC: 50 U/L (ref 15–37)
BASOPHILS # BLD: 0 K/UL (ref 0–0.2)
BASOPHILS NFR BLD: 0.4 %
BILIRUB SERPL-MCNC: 0.4 MG/DL (ref 0–1)
BILIRUB UR QL STRIP.AUTO: NEGATIVE
BUN SERPL-MCNC: 15 MG/DL (ref 7–20)
CALCIUM SERPL-MCNC: 10 MG/DL (ref 8.3–10.6)
CHLORIDE SERPL-SCNC: 101 MMOL/L (ref 99–110)
CLARITY UR: CLEAR
CO2 SERPL-SCNC: 24 MMOL/L (ref 21–32)
COLOR UR: YELLOW
CREAT SERPL-MCNC: 0.6 MG/DL (ref 0.6–1.2)
DEPRECATED RDW RBC AUTO: 13.9 % (ref 12.4–15.4)
EOSINOPHIL # BLD: 0.1 K/UL (ref 0–0.6)
EOSINOPHIL NFR BLD: 1 %
FLUAV RNA RESP QL NAA+PROBE: NOT DETECTED
FLUBV RNA RESP QL NAA+PROBE: NOT DETECTED
GFR SERPLBLD CREATININE-BSD FMLA CKD-EPI: >90 ML/MIN/{1.73_M2}
GLUCOSE SERPL-MCNC: 232 MG/DL (ref 70–99)
GLUCOSE UR STRIP.AUTO-MCNC: >=1000 MG/DL
HCT VFR BLD AUTO: 41.1 % (ref 36–48)
HGB BLD-MCNC: 13.6 G/DL (ref 12–16)
HGB UR QL STRIP.AUTO: NEGATIVE
KETONES UR STRIP.AUTO-MCNC: ABNORMAL MG/DL
LACTATE BLDV-SCNC: 1.8 MMOL/L (ref 0.4–2)
LEUKOCYTE ESTERASE UR QL STRIP.AUTO: NEGATIVE
LIPASE SERPL-CCNC: 41 U/L (ref 13–60)
LYMPHOCYTES # BLD: 2.2 K/UL (ref 1–5.1)
LYMPHOCYTES NFR BLD: 26.8 %
MCH RBC QN AUTO: 28 PG (ref 26–34)
MCHC RBC AUTO-ENTMCNC: 33.1 G/DL (ref 31–36)
MCV RBC AUTO: 84.5 FL (ref 80–100)
MONOCYTES # BLD: 0.7 K/UL (ref 0–1.3)
MONOCYTES NFR BLD: 8.1 %
NEUTROPHILS # BLD: 5.2 K/UL (ref 1.7–7.7)
NEUTROPHILS NFR BLD: 63.7 %
NITRITE UR QL STRIP.AUTO: NEGATIVE
PH UR STRIP.AUTO: 6 [PH] (ref 5–8)
PLATELET # BLD AUTO: 253 K/UL (ref 135–450)
PMV BLD AUTO: 9 FL (ref 5–10.5)
POTASSIUM SERPL-SCNC: 4.2 MMOL/L (ref 3.5–5.1)
PROT SERPL-MCNC: 7.8 G/DL (ref 6.4–8.2)
PROT UR STRIP.AUTO-MCNC: NEGATIVE MG/DL
RBC # BLD AUTO: 4.86 M/UL (ref 4–5.2)
SARS-COV-2 RNA RESP QL NAA+PROBE: NOT DETECTED
SODIUM SERPL-SCNC: 137 MMOL/L (ref 136–145)
SP GR UR STRIP.AUTO: >=1.03 (ref 1–1.03)
UA COMPLETE W REFLEX CULTURE PNL UR: ABNORMAL
UA DIPSTICK W REFLEX MICRO PNL UR: ABNORMAL
URN SPEC COLLECT METH UR: ABNORMAL
UROBILINOGEN UR STRIP-ACNC: 0.2 E.U./DL
WBC # BLD AUTO: 8.2 K/UL (ref 4–11)

## 2024-08-09 PROCEDURE — 96374 THER/PROPH/DIAG INJ IV PUSH: CPT

## 2024-08-09 PROCEDURE — 99285 EMERGENCY DEPT VISIT HI MDM: CPT

## 2024-08-09 PROCEDURE — 83690 ASSAY OF LIPASE: CPT

## 2024-08-09 PROCEDURE — 83605 ASSAY OF LACTIC ACID: CPT

## 2024-08-09 PROCEDURE — 87636 SARSCOV2 & INF A&B AMP PRB: CPT

## 2024-08-09 PROCEDURE — 6360000002 HC RX W HCPCS: Performed by: PHYSICIAN ASSISTANT

## 2024-08-09 PROCEDURE — 74177 CT ABD & PELVIS W/CONTRAST: CPT

## 2024-08-09 PROCEDURE — 96375 TX/PRO/DX INJ NEW DRUG ADDON: CPT

## 2024-08-09 PROCEDURE — 76830 TRANSVAGINAL US NON-OB: CPT

## 2024-08-09 PROCEDURE — 6370000000 HC RX 637 (ALT 250 FOR IP): Performed by: PHYSICIAN ASSISTANT

## 2024-08-09 PROCEDURE — 2580000003 HC RX 258: Performed by: PHYSICIAN ASSISTANT

## 2024-08-09 PROCEDURE — 6360000004 HC RX CONTRAST MEDICATION: Performed by: PHYSICIAN ASSISTANT

## 2024-08-09 PROCEDURE — 81003 URINALYSIS AUTO W/O SCOPE: CPT

## 2024-08-09 PROCEDURE — 85025 COMPLETE CBC W/AUTO DIFF WBC: CPT

## 2024-08-09 PROCEDURE — 80053 COMPREHEN METABOLIC PANEL: CPT

## 2024-08-09 RX ORDER — ONDANSETRON 2 MG/ML
4 INJECTION INTRAMUSCULAR; INTRAVENOUS ONCE
Status: COMPLETED | OUTPATIENT
Start: 2024-08-09 | End: 2024-08-09

## 2024-08-09 RX ORDER — ONDANSETRON 4 MG/1
4 TABLET, FILM COATED ORAL EVERY 8 HOURS PRN
Qty: 20 TABLET | Refills: 0 | Status: SHIPPED | OUTPATIENT
Start: 2024-08-09

## 2024-08-09 RX ORDER — LIDOCAINE 4 G/G
1 PATCH TOPICAL DAILY
Qty: 30 PATCH | Refills: 0 | Status: SHIPPED | OUTPATIENT
Start: 2024-08-09 | End: 2024-09-08

## 2024-08-09 RX ORDER — KETOROLAC TROMETHAMINE 30 MG/ML
15 INJECTION, SOLUTION INTRAMUSCULAR; INTRAVENOUS ONCE
Status: COMPLETED | OUTPATIENT
Start: 2024-08-09 | End: 2024-08-09

## 2024-08-09 RX ORDER — 0.9 % SODIUM CHLORIDE 0.9 %
1000 INTRAVENOUS SOLUTION INTRAVENOUS ONCE
Status: COMPLETED | OUTPATIENT
Start: 2024-08-09 | End: 2024-08-09

## 2024-08-09 RX ORDER — KETOROLAC TROMETHAMINE 10 MG/1
10 TABLET, FILM COATED ORAL EVERY 6 HOURS PRN
Qty: 20 TABLET | Refills: 0 | Status: SHIPPED | OUTPATIENT
Start: 2024-08-09 | End: 2025-08-09

## 2024-08-09 RX ADMIN — MAJOR MAGNESIUM CITRATE ORAL SOLUTION - LEMON 296 ML: 1.75 LIQUID ORAL at 16:49

## 2024-08-09 RX ADMIN — KETOROLAC TROMETHAMINE 15 MG: 30 INJECTION, SOLUTION INTRAMUSCULAR at 13:08

## 2024-08-09 RX ADMIN — ONDANSETRON 4 MG: 2 INJECTION, SOLUTION INTRAMUSCULAR; INTRAVENOUS at 13:09

## 2024-08-09 RX ADMIN — IOPAMIDOL 75 ML: 755 INJECTION, SOLUTION INTRAVENOUS at 14:18

## 2024-08-09 RX ADMIN — SODIUM CHLORIDE 1000 ML: 9 INJECTION, SOLUTION INTRAVENOUS at 14:23

## 2024-08-09 ASSESSMENT — ENCOUNTER SYMPTOMS
SINUS PRESSURE: 0
VOMITING: 0
RHINORRHEA: 0
CHEST TIGHTNESS: 0
ABDOMINAL PAIN: 0
COUGH: 0
SHORTNESS OF BREATH: 0
COLOR CHANGE: 0
EYE REDNESS: 0
SORE THROAT: 0
NAUSEA: 0
DIARRHEA: 0
CONSTIPATION: 0
EYE DISCHARGE: 0
SINUS PAIN: 0

## 2024-08-09 ASSESSMENT — PAIN DESCRIPTION - LOCATION: LOCATION: ABDOMEN

## 2024-08-09 ASSESSMENT — PAIN SCALES - GENERAL: PAINLEVEL_OUTOF10: 10

## 2024-08-09 ASSESSMENT — PAIN - FUNCTIONAL ASSESSMENT: PAIN_FUNCTIONAL_ASSESSMENT: 0-10

## 2024-08-09 ASSESSMENT — PAIN DESCRIPTION - ORIENTATION: ORIENTATION: LEFT;RIGHT

## 2024-08-09 ASSESSMENT — PAIN DESCRIPTION - DESCRIPTORS: DESCRIPTORS: SHARP;SQUEEZING

## 2024-08-09 NOTE — ED PROVIDER NOTES
[facility]  ED  EMERGENCY DEPARTMENT ENCOUNTER        Pt Name: Radha Ordonez  MRN: [id number]  Birthdate 1965  Date of evaluation: 8/9/2024  Provider: Corine Elizondo PA-C  PCP: Whit Beard MD  Note Started: 12:46 PM EDT 8/9/24      GORGE. I have evaluated this patient.        CHIEF COMPLAINT       Chief Complaint   Patient presents with    Abdominal Pain     States abd pain and constipation for about a week,. PCP sent over from abn CT scan     Nausea       HISTORY OF PRESENT ILLNESS: 1 or more Elements     History from : Patient    Limitations to history : None    Radha Ordonez is a 59 y.o. female with a past medical history of breast cancer with double mastectomy who presents ER for evaluation.  Patient was seen by PCP earlier this week and CT without contrast was ordered CT showed abnormalities and patient came into the ER.  Patient reports she has had approximately 1 week of intermittent episodes of left upper quadrant abdominal pain with radiation to her back.  Associate with constipation.  Patient concerned for malignancy/metastasis of her breast cancer.  Nursing Notes were all reviewed and agreed with or any disagreements were addressed in the HPI.    REVIEW OF SYSTEMS :      Review of Systems   Constitutional:  Negative for chills, fatigue and fever.   HENT: Negative.  Negative for congestion, rhinorrhea, sinus pressure, sinus pain and sore throat.    Eyes:  Negative for discharge, redness and visual disturbance.   Respiratory:  Negative for cough, chest tightness and shortness of breath.    Cardiovascular:  Negative for chest pain and palpitations.   Gastrointestinal:  Negative for abdominal pain, constipation, diarrhea, nausea and vomiting.   Genitourinary: Negative.  Negative for difficulty urinating, dysuria and frequency.   Musculoskeletal: Negative.    Skin: Negative.  Negative for color change and rash.   Neurological: Negative.  Negative for dizziness, weakness, numbness and

## 2025-01-08 LAB
ALBUMIN: 5 G/DL (ref 3.5–5)
ANION GAP SERPL CALCULATED.3IONS-SCNC: 11 MMOL/L (ref 5–13)
BUN / CREAT RATIO: 18
BUN BLDV-MCNC: 15 MG/DL (ref 7–25)
CALCIUM SERPL-MCNC: 10.7 MG/DL (ref 8.5–10.5)
CHLORIDE BLD-SCNC: 101 MMOL/L (ref 98–110)
CO2: 28 MMOL/L (ref 22–29)
CREAT SERPL-MCNC: 0.84 MG/DL (ref 0.5–1.2)
EGFR (CKD-EPI): 80 SEE NOTE
ESTIMATED AVERAGE GLUCOSE: 131 MG/DL (ref 68–114)
GLUCOSE BLD-MCNC: 163 MG/DL (ref 71–99)
HBA1C MFR BLD: 6.2 % (ref 4–5.6)
HCT VFR BLD CALC: 45.2 % (ref 35–46)
HEMOGLOBIN: 14.9 G/DL (ref 11.7–15.5)
LEUKOCYTES, BLD: 9.44 10*3/UL (ref 3.8–10.8)
MCH RBC QN AUTO: 29.3 PG (ref 27–33)
MCHC RBC AUTO-ENTMCNC: 33 G/DL (ref 30–36)
MCV RBC AUTO: 89 FL (ref 80–100)
PDW BLD-RTO: 12.6 % (ref 11–15)
PLATELET # BLD: 326 10*3/UL (ref 140–400)
PMV BLD AUTO: 10.7 FL (ref 9–13)
POTASSIUM SERPL-SCNC: 4.1 MMOL/L (ref 3.5–5.1)
RBC # BLD: 5.08 10*6/UL (ref 3.8–5.1)
SODIUM BLD-SCNC: 140 MMOL/L (ref 135–146)

## 2025-08-22 ENCOUNTER — HOSPITAL ENCOUNTER (OUTPATIENT)
Dept: GENERAL RADIOLOGY | Age: 60
Discharge: HOME | End: 2025-08-22
Payer: MEDICARE

## 2025-08-22 DIAGNOSIS — M84.375A STRESS FRACTURE OF METATARSAL BONE OF LEFT FOOT, INITIAL ENCOUNTER: ICD-10-CM

## 2025-08-22 PROCEDURE — 73630 X-RAY EXAM OF FOOT: CPT

## (undated) DEVICE — PROVE COVER: Brand: UNBRANDED

## (undated) DEVICE — X-RAY DETECTABLE SPONGES,16 PLY: Brand: VISTEC

## (undated) DEVICE — SURGICAL PROCEDURE PACK IV U-BAR

## (undated) DEVICE — SOLUTION IV 500ML 0.9% SOD CHL PH 5 INJ USP VIAFLX PLAS

## (undated) DEVICE — CHLORAPREP 26ML ORANGE

## (undated) DEVICE — SUTURE MCRYL SZ 4-0 L18IN ABSRB UD L19MM PS-2 3/8 CIR PRIM Y496G

## (undated) DEVICE — SET ADMIN PRIMING 7ML L30IN 7.35LB 20 GTT 2ND RLER CLMP

## (undated) DEVICE — INTENDED FOR TISSUE SEPARATION, AND OTHER PROCEDURES THAT REQUIRE A SHARP SURGICAL BLADE TO PUNCTURE OR CUT.: Brand: BARD-PARKER ® STAINLESS STEEL BLADES

## (undated) DEVICE — DECANTER FLD 9IN ST BG FOR ASEP TRNSF OF FLD

## (undated) DEVICE — SYRINGE MED 10ML LUERLOCK TIP W/O SFTY DISP

## (undated) DEVICE — ELECTRODE PT RET AD L9FT HI MOIST COND ADH HYDRGEL CORDED

## (undated) DEVICE — SET GRAV VENT NVENT CK VLV 3 NDL FREE PRT 10 GTT

## (undated) DEVICE — C-ARM: Brand: UNBRANDED

## (undated) DEVICE — CATHETER IV 20GA L1.25IN PNK FEP SFTY STR HUB RADPQ DISP

## (undated) DEVICE — GLOVE SURG SZ 65 L12IN FNGR THK94MIL STD WHT LTX FREE

## (undated) DEVICE — MINOR SET UP PK

## (undated) DEVICE — PENCIL ES L3M BTTN SWCH S STL HEX LOK BLDE ELECTRD HOLSTER

## (undated) DEVICE — SUTURE VCRL SZ 3-0 L27IN ABSRB UD L26MM SH 1/2 CIR J416H

## (undated) DEVICE — SOLUTION IV 1000ML LAC RINGERS PH 6.5 INJ USP VIAFLX PLAS

## (undated) DEVICE — 3M™ TEGADERM™ TRANSPARENT FILM DRESSING FRAME STYLE, 1624W, 2-3/8 IN X 2-3/4 IN (6 CM X 7 CM), 100/CT 4CT/CASE: Brand: 3M™ TEGADERM™

## (undated) DEVICE — GLOVE,SURG,SENSICARE,ALOE,LF,PF,7: Brand: MEDLINE

## (undated) DEVICE — GOWN,SIRUS,NON REINFRCD,LARGE,SET IN SL: Brand: MEDLINE

## (undated) DEVICE — Z DUP USE 2257490 ADHESIVE SKIN CLSRE 036ML TPCL 2CTL CNCRLTE HIGH VSCSTY DRMB

## (undated) DEVICE — SUTURE PROL 2-0 L48IN NONABSORBABLE BLU SH L26MM 1/2 CIR 8533H